# Patient Record
Sex: FEMALE | Race: OTHER | HISPANIC OR LATINO
[De-identification: names, ages, dates, MRNs, and addresses within clinical notes are randomized per-mention and may not be internally consistent; named-entity substitution may affect disease eponyms.]

---

## 2023-11-13 PROBLEM — Z00.00 ENCOUNTER FOR PREVENTIVE HEALTH EXAMINATION: Status: ACTIVE | Noted: 2023-11-13

## 2023-11-21 ENCOUNTER — APPOINTMENT (OUTPATIENT)
Dept: CARDIOLOGY | Facility: CLINIC | Age: 58
End: 2023-11-21
Payer: MEDICAID

## 2023-11-21 ENCOUNTER — NON-APPOINTMENT (OUTPATIENT)
Age: 58
End: 2023-11-21

## 2023-11-21 VITALS
SYSTOLIC BLOOD PRESSURE: 136 MMHG | HEART RATE: 67 BPM | WEIGHT: 159 LBS | HEIGHT: 59.06 IN | RESPIRATION RATE: 16 BRPM | DIASTOLIC BLOOD PRESSURE: 72 MMHG | TEMPERATURE: 98.4 F | OXYGEN SATURATION: 99 % | BODY MASS INDEX: 32.05 KG/M2

## 2023-11-21 DIAGNOSIS — I51.7 CARDIOMEGALY: ICD-10-CM

## 2023-11-21 DIAGNOSIS — Z78.9 OTHER SPECIFIED HEALTH STATUS: ICD-10-CM

## 2023-11-21 PROCEDURE — 93000 ELECTROCARDIOGRAM COMPLETE: CPT

## 2023-11-21 PROCEDURE — 99204 OFFICE O/P NEW MOD 45 MIN: CPT | Mod: 25

## 2023-11-28 ENCOUNTER — NON-APPOINTMENT (OUTPATIENT)
Age: 58
End: 2023-11-28

## 2023-11-28 PROBLEM — Z00.00 ENCOUNTER FOR PREVENTIVE HEALTH EXAMINATION: Status: ACTIVE | Noted: 2023-11-28

## 2023-12-01 ENCOUNTER — OUTPATIENT (OUTPATIENT)
Dept: OUTPATIENT SERVICES | Facility: HOSPITAL | Age: 58
LOS: 1 days | End: 2023-12-01
Payer: MEDICAID

## 2023-12-01 ENCOUNTER — APPOINTMENT (OUTPATIENT)
Dept: CT IMAGING | Facility: CLINIC | Age: 58
End: 2023-12-01
Payer: MEDICAID

## 2023-12-01 ENCOUNTER — APPOINTMENT (OUTPATIENT)
Dept: CT IMAGING | Facility: CLINIC | Age: 58
End: 2023-12-01

## 2023-12-01 DIAGNOSIS — Z00.8 ENCOUNTER FOR OTHER GENERAL EXAMINATION: ICD-10-CM

## 2023-12-01 DIAGNOSIS — R07.89 OTHER CHEST PAIN: ICD-10-CM

## 2023-12-01 PROCEDURE — 75574 CT ANGIO HRT W/3D IMAGE: CPT

## 2023-12-01 PROCEDURE — 75574 CT ANGIO HRT W/3D IMAGE: CPT | Mod: 26

## 2023-12-22 ENCOUNTER — TRANSCRIPTION ENCOUNTER (OUTPATIENT)
Age: 58
End: 2023-12-22

## 2023-12-22 ENCOUNTER — APPOINTMENT (OUTPATIENT)
Dept: CARDIOLOGY | Facility: CLINIC | Age: 58
End: 2023-12-22
Payer: MEDICAID

## 2023-12-22 PROCEDURE — 93306 TTE W/DOPPLER COMPLETE: CPT

## 2023-12-25 ENCOUNTER — INPATIENT (INPATIENT)
Facility: HOSPITAL | Age: 58
LOS: 3 days | Discharge: HOME HEALTH | DRG: 347 | End: 2023-12-29
Attending: INTERNAL MEDICINE | Admitting: INTERNAL MEDICINE
Payer: MEDICAID

## 2023-12-25 VITALS — HEIGHT: 62 IN | WEIGHT: 162.04 LBS

## 2023-12-25 DIAGNOSIS — I63.9 CEREBRAL INFARCTION, UNSPECIFIED: ICD-10-CM

## 2023-12-25 DIAGNOSIS — N28.1 CYST OF KIDNEY, ACQUIRED: Chronic | ICD-10-CM

## 2023-12-25 LAB
ABO RH CONFIRMATION: SIGNIFICANT CHANGE UP
ABO RH CONFIRMATION: SIGNIFICANT CHANGE UP
ALBUMIN SERPL ELPH-MCNC: 3.9 G/DL — SIGNIFICANT CHANGE UP (ref 3.3–5)
ALBUMIN SERPL ELPH-MCNC: 3.9 G/DL — SIGNIFICANT CHANGE UP (ref 3.3–5)
ALP SERPL-CCNC: 61 U/L — SIGNIFICANT CHANGE UP (ref 40–120)
ALP SERPL-CCNC: 61 U/L — SIGNIFICANT CHANGE UP (ref 40–120)
ALT FLD-CCNC: 21 U/L — SIGNIFICANT CHANGE UP (ref 12–78)
ALT FLD-CCNC: 21 U/L — SIGNIFICANT CHANGE UP (ref 12–78)
ANION GAP SERPL CALC-SCNC: 2 MMOL/L — LOW (ref 5–17)
ANION GAP SERPL CALC-SCNC: 2 MMOL/L — LOW (ref 5–17)
APPEARANCE UR: CLEAR — SIGNIFICANT CHANGE UP
APPEARANCE UR: CLEAR — SIGNIFICANT CHANGE UP
APTT BLD: 35 SEC — SIGNIFICANT CHANGE UP (ref 24.5–35.6)
APTT BLD: 35 SEC — SIGNIFICANT CHANGE UP (ref 24.5–35.6)
AST SERPL-CCNC: 24 U/L — SIGNIFICANT CHANGE UP (ref 15–37)
AST SERPL-CCNC: 24 U/L — SIGNIFICANT CHANGE UP (ref 15–37)
BASOPHILS # BLD AUTO: 0.07 K/UL — SIGNIFICANT CHANGE UP (ref 0–0.2)
BASOPHILS # BLD AUTO: 0.07 K/UL — SIGNIFICANT CHANGE UP (ref 0–0.2)
BASOPHILS NFR BLD AUTO: 1.1 % — SIGNIFICANT CHANGE UP (ref 0–2)
BASOPHILS NFR BLD AUTO: 1.1 % — SIGNIFICANT CHANGE UP (ref 0–2)
BILIRUB SERPL-MCNC: 0.5 MG/DL — SIGNIFICANT CHANGE UP (ref 0.2–1.2)
BILIRUB SERPL-MCNC: 0.5 MG/DL — SIGNIFICANT CHANGE UP (ref 0.2–1.2)
BILIRUB UR-MCNC: NEGATIVE — SIGNIFICANT CHANGE UP
BILIRUB UR-MCNC: NEGATIVE — SIGNIFICANT CHANGE UP
BLD GP AB SCN SERPL QL: SIGNIFICANT CHANGE UP
BLD GP AB SCN SERPL QL: SIGNIFICANT CHANGE UP
BUN SERPL-MCNC: 14 MG/DL — SIGNIFICANT CHANGE UP (ref 7–23)
BUN SERPL-MCNC: 14 MG/DL — SIGNIFICANT CHANGE UP (ref 7–23)
CALCIUM SERPL-MCNC: 8.7 MG/DL — SIGNIFICANT CHANGE UP (ref 8.5–10.1)
CALCIUM SERPL-MCNC: 8.7 MG/DL — SIGNIFICANT CHANGE UP (ref 8.5–10.1)
CHLORIDE SERPL-SCNC: 110 MMOL/L — HIGH (ref 96–108)
CHLORIDE SERPL-SCNC: 110 MMOL/L — HIGH (ref 96–108)
CO2 SERPL-SCNC: 28 MMOL/L — SIGNIFICANT CHANGE UP (ref 22–31)
CO2 SERPL-SCNC: 28 MMOL/L — SIGNIFICANT CHANGE UP (ref 22–31)
COLOR SPEC: YELLOW — SIGNIFICANT CHANGE UP
COLOR SPEC: YELLOW — SIGNIFICANT CHANGE UP
CREAT SERPL-MCNC: 0.82 MG/DL — SIGNIFICANT CHANGE UP (ref 0.5–1.3)
CREAT SERPL-MCNC: 0.82 MG/DL — SIGNIFICANT CHANGE UP (ref 0.5–1.3)
DIFF PNL FLD: NEGATIVE — SIGNIFICANT CHANGE UP
DIFF PNL FLD: NEGATIVE — SIGNIFICANT CHANGE UP
EGFR: 82 ML/MIN/1.73M2 — SIGNIFICANT CHANGE UP
EGFR: 82 ML/MIN/1.73M2 — SIGNIFICANT CHANGE UP
EOSINOPHIL # BLD AUTO: 0.08 K/UL — SIGNIFICANT CHANGE UP (ref 0–0.5)
EOSINOPHIL # BLD AUTO: 0.08 K/UL — SIGNIFICANT CHANGE UP (ref 0–0.5)
EOSINOPHIL NFR BLD AUTO: 1.3 % — SIGNIFICANT CHANGE UP (ref 0–6)
EOSINOPHIL NFR BLD AUTO: 1.3 % — SIGNIFICANT CHANGE UP (ref 0–6)
GLUCOSE SERPL-MCNC: 113 MG/DL — HIGH (ref 70–99)
GLUCOSE SERPL-MCNC: 113 MG/DL — HIGH (ref 70–99)
GLUCOSE UR QL: NEGATIVE MG/DL — SIGNIFICANT CHANGE UP
GLUCOSE UR QL: NEGATIVE MG/DL — SIGNIFICANT CHANGE UP
HCT VFR BLD CALC: 38.9 % — SIGNIFICANT CHANGE UP (ref 34.5–45)
HCT VFR BLD CALC: 38.9 % — SIGNIFICANT CHANGE UP (ref 34.5–45)
HGB BLD-MCNC: 12.9 G/DL — SIGNIFICANT CHANGE UP (ref 11.5–15.5)
HGB BLD-MCNC: 12.9 G/DL — SIGNIFICANT CHANGE UP (ref 11.5–15.5)
IMM GRANULOCYTES NFR BLD AUTO: 0.2 % — SIGNIFICANT CHANGE UP (ref 0–0.9)
IMM GRANULOCYTES NFR BLD AUTO: 0.2 % — SIGNIFICANT CHANGE UP (ref 0–0.9)
INR BLD: 1 RATIO — SIGNIFICANT CHANGE UP (ref 0.85–1.18)
INR BLD: 1 RATIO — SIGNIFICANT CHANGE UP (ref 0.85–1.18)
KETONES UR-MCNC: NEGATIVE MG/DL — SIGNIFICANT CHANGE UP
KETONES UR-MCNC: NEGATIVE MG/DL — SIGNIFICANT CHANGE UP
LEUKOCYTE ESTERASE UR-ACNC: NEGATIVE — SIGNIFICANT CHANGE UP
LEUKOCYTE ESTERASE UR-ACNC: NEGATIVE — SIGNIFICANT CHANGE UP
LYMPHOCYTES # BLD AUTO: 2.21 K/UL — SIGNIFICANT CHANGE UP (ref 1–3.3)
LYMPHOCYTES # BLD AUTO: 2.21 K/UL — SIGNIFICANT CHANGE UP (ref 1–3.3)
LYMPHOCYTES # BLD AUTO: 34.6 % — SIGNIFICANT CHANGE UP (ref 13–44)
LYMPHOCYTES # BLD AUTO: 34.6 % — SIGNIFICANT CHANGE UP (ref 13–44)
MCHC RBC-ENTMCNC: 29.3 PG — SIGNIFICANT CHANGE UP (ref 27–34)
MCHC RBC-ENTMCNC: 29.3 PG — SIGNIFICANT CHANGE UP (ref 27–34)
MCHC RBC-ENTMCNC: 33.2 GM/DL — SIGNIFICANT CHANGE UP (ref 32–36)
MCHC RBC-ENTMCNC: 33.2 GM/DL — SIGNIFICANT CHANGE UP (ref 32–36)
MCV RBC AUTO: 88.4 FL — SIGNIFICANT CHANGE UP (ref 80–100)
MCV RBC AUTO: 88.4 FL — SIGNIFICANT CHANGE UP (ref 80–100)
MONOCYTES # BLD AUTO: 0.6 K/UL — SIGNIFICANT CHANGE UP (ref 0–0.9)
MONOCYTES # BLD AUTO: 0.6 K/UL — SIGNIFICANT CHANGE UP (ref 0–0.9)
MONOCYTES NFR BLD AUTO: 9.4 % — SIGNIFICANT CHANGE UP (ref 2–14)
MONOCYTES NFR BLD AUTO: 9.4 % — SIGNIFICANT CHANGE UP (ref 2–14)
NEUTROPHILS # BLD AUTO: 3.42 K/UL — SIGNIFICANT CHANGE UP (ref 1.8–7.4)
NEUTROPHILS # BLD AUTO: 3.42 K/UL — SIGNIFICANT CHANGE UP (ref 1.8–7.4)
NEUTROPHILS NFR BLD AUTO: 53.4 % — SIGNIFICANT CHANGE UP (ref 43–77)
NEUTROPHILS NFR BLD AUTO: 53.4 % — SIGNIFICANT CHANGE UP (ref 43–77)
NITRITE UR-MCNC: NEGATIVE — SIGNIFICANT CHANGE UP
NITRITE UR-MCNC: NEGATIVE — SIGNIFICANT CHANGE UP
PH UR: 8 — SIGNIFICANT CHANGE UP (ref 5–8)
PH UR: 8 — SIGNIFICANT CHANGE UP (ref 5–8)
PLATELET # BLD AUTO: 221 K/UL — SIGNIFICANT CHANGE UP (ref 150–400)
PLATELET # BLD AUTO: 221 K/UL — SIGNIFICANT CHANGE UP (ref 150–400)
POTASSIUM SERPL-MCNC: 4.1 MMOL/L — SIGNIFICANT CHANGE UP (ref 3.5–5.3)
POTASSIUM SERPL-MCNC: 4.1 MMOL/L — SIGNIFICANT CHANGE UP (ref 3.5–5.3)
POTASSIUM SERPL-SCNC: 4.1 MMOL/L — SIGNIFICANT CHANGE UP (ref 3.5–5.3)
POTASSIUM SERPL-SCNC: 4.1 MMOL/L — SIGNIFICANT CHANGE UP (ref 3.5–5.3)
PROT SERPL-MCNC: 7.6 GM/DL — SIGNIFICANT CHANGE UP (ref 6–8.3)
PROT SERPL-MCNC: 7.6 GM/DL — SIGNIFICANT CHANGE UP (ref 6–8.3)
PROT UR-MCNC: NEGATIVE MG/DL — SIGNIFICANT CHANGE UP
PROT UR-MCNC: NEGATIVE MG/DL — SIGNIFICANT CHANGE UP
PROTHROM AB SERPL-ACNC: 11.3 SEC — SIGNIFICANT CHANGE UP (ref 9.5–13)
PROTHROM AB SERPL-ACNC: 11.3 SEC — SIGNIFICANT CHANGE UP (ref 9.5–13)
RBC # BLD: 4.4 M/UL — SIGNIFICANT CHANGE UP (ref 3.8–5.2)
RBC # BLD: 4.4 M/UL — SIGNIFICANT CHANGE UP (ref 3.8–5.2)
RBC # FLD: 12.8 % — SIGNIFICANT CHANGE UP (ref 10.3–14.5)
RBC # FLD: 12.8 % — SIGNIFICANT CHANGE UP (ref 10.3–14.5)
SODIUM SERPL-SCNC: 140 MMOL/L — SIGNIFICANT CHANGE UP (ref 135–145)
SODIUM SERPL-SCNC: 140 MMOL/L — SIGNIFICANT CHANGE UP (ref 135–145)
SP GR SPEC: 1.02 — SIGNIFICANT CHANGE UP (ref 1–1.03)
SP GR SPEC: 1.02 — SIGNIFICANT CHANGE UP (ref 1–1.03)
TROPONIN I, HIGH SENSITIVITY RESULT: 5.61 NG/L — SIGNIFICANT CHANGE UP
TROPONIN I, HIGH SENSITIVITY RESULT: 5.61 NG/L — SIGNIFICANT CHANGE UP
TROPONIN I, HIGH SENSITIVITY RESULT: 5.64 NG/L — SIGNIFICANT CHANGE UP
TROPONIN I, HIGH SENSITIVITY RESULT: 5.64 NG/L — SIGNIFICANT CHANGE UP
TROPONIN I, HIGH SENSITIVITY RESULT: 6.49 NG/L — SIGNIFICANT CHANGE UP
TROPONIN I, HIGH SENSITIVITY RESULT: 6.49 NG/L — SIGNIFICANT CHANGE UP
UROBILINOGEN FLD QL: 0.2 MG/DL — SIGNIFICANT CHANGE UP (ref 0.2–1)
UROBILINOGEN FLD QL: 0.2 MG/DL — SIGNIFICANT CHANGE UP (ref 0.2–1)
WBC # BLD: 6.39 K/UL — SIGNIFICANT CHANGE UP (ref 3.8–10.5)
WBC # BLD: 6.39 K/UL — SIGNIFICANT CHANGE UP (ref 3.8–10.5)
WBC # FLD AUTO: 6.39 K/UL — SIGNIFICANT CHANGE UP (ref 3.8–10.5)
WBC # FLD AUTO: 6.39 K/UL — SIGNIFICANT CHANGE UP (ref 3.8–10.5)

## 2023-12-25 PROCEDURE — 97166 OT EVAL MOD COMPLEX 45 MIN: CPT | Mod: GO

## 2023-12-25 PROCEDURE — 70496 CT ANGIOGRAPHY HEAD: CPT | Mod: 26,MA

## 2023-12-25 PROCEDURE — 99291 CRITICAL CARE FIRST HOUR: CPT

## 2023-12-25 PROCEDURE — 84484 ASSAY OF TROPONIN QUANT: CPT

## 2023-12-25 PROCEDURE — 70450 CT HEAD/BRAIN W/O DYE: CPT | Mod: 26,MA,59

## 2023-12-25 PROCEDURE — 0042T: CPT | Mod: MA

## 2023-12-25 PROCEDURE — 36415 COLL VENOUS BLD VENIPUNCTURE: CPT

## 2023-12-25 PROCEDURE — 72148 MRI LUMBAR SPINE W/O DYE: CPT

## 2023-12-25 PROCEDURE — 83036 HEMOGLOBIN GLYCOSYLATED A1C: CPT

## 2023-12-25 PROCEDURE — 84443 ASSAY THYROID STIM HORMONE: CPT

## 2023-12-25 PROCEDURE — 85027 COMPLETE CBC AUTOMATED: CPT

## 2023-12-25 PROCEDURE — 84100 ASSAY OF PHOSPHORUS: CPT

## 2023-12-25 PROCEDURE — 72141 MRI NECK SPINE W/O DYE: CPT

## 2023-12-25 PROCEDURE — 97163 PT EVAL HIGH COMPLEX 45 MIN: CPT | Mod: GP

## 2023-12-25 PROCEDURE — 92523 SPEECH SOUND LANG COMPREHEN: CPT | Mod: GN

## 2023-12-25 PROCEDURE — 72146 MRI CHEST SPINE W/O DYE: CPT

## 2023-12-25 PROCEDURE — 99223 1ST HOSP IP/OBS HIGH 75: CPT

## 2023-12-25 PROCEDURE — 80061 LIPID PANEL: CPT

## 2023-12-25 PROCEDURE — 97116 GAIT TRAINING THERAPY: CPT | Mod: GP

## 2023-12-25 PROCEDURE — 80048 BASIC METABOLIC PNL TOTAL CA: CPT

## 2023-12-25 PROCEDURE — 70498 CT ANGIOGRAPHY NECK: CPT | Mod: 26,MA

## 2023-12-25 PROCEDURE — 86803 HEPATITIS C AB TEST: CPT

## 2023-12-25 PROCEDURE — 97530 THERAPEUTIC ACTIVITIES: CPT | Mod: GO

## 2023-12-25 PROCEDURE — 93306 TTE W/DOPPLER COMPLETE: CPT

## 2023-12-25 PROCEDURE — 93010 ELECTROCARDIOGRAM REPORT: CPT

## 2023-12-25 PROCEDURE — 70551 MRI BRAIN STEM W/O DYE: CPT

## 2023-12-25 PROCEDURE — 97110 THERAPEUTIC EXERCISES: CPT | Mod: GO

## 2023-12-25 PROCEDURE — 71045 X-RAY EXAM CHEST 1 VIEW: CPT | Mod: 26

## 2023-12-25 PROCEDURE — 92610 EVALUATE SWALLOWING FUNCTION: CPT | Mod: GN

## 2023-12-25 PROCEDURE — 83735 ASSAY OF MAGNESIUM: CPT

## 2023-12-25 PROCEDURE — 99284 EMERGENCY DEPT VISIT MOD MDM: CPT

## 2023-12-25 RX ORDER — ASPIRIN/CALCIUM CARB/MAGNESIUM 324 MG
324 TABLET ORAL ONCE
Refills: 0 | Status: COMPLETED | OUTPATIENT
Start: 2023-12-25 | End: 2023-12-25

## 2023-12-25 RX ORDER — ONDANSETRON 8 MG/1
4 TABLET, FILM COATED ORAL EVERY 8 HOURS
Refills: 0 | Status: DISCONTINUED | OUTPATIENT
Start: 2023-12-25 | End: 2023-12-29

## 2023-12-25 RX ORDER — CLOPIDOGREL BISULFATE 75 MG/1
75 TABLET, FILM COATED ORAL DAILY
Refills: 0 | Status: DISCONTINUED | OUTPATIENT
Start: 2023-12-26 | End: 2023-12-26

## 2023-12-25 RX ORDER — ACETAMINOPHEN 500 MG
650 TABLET ORAL EVERY 6 HOURS
Refills: 0 | Status: DISCONTINUED | OUTPATIENT
Start: 2023-12-25 | End: 2023-12-29

## 2023-12-25 RX ORDER — ATORVASTATIN CALCIUM 80 MG/1
80 TABLET, FILM COATED ORAL AT BEDTIME
Refills: 0 | Status: DISCONTINUED | OUTPATIENT
Start: 2023-12-25 | End: 2023-12-26

## 2023-12-25 RX ORDER — PANTOPRAZOLE SODIUM 20 MG/1
40 TABLET, DELAYED RELEASE ORAL
Refills: 0 | Status: DISCONTINUED | OUTPATIENT
Start: 2023-12-25 | End: 2023-12-29

## 2023-12-25 RX ORDER — CLOPIDOGREL BISULFATE 75 MG/1
300 TABLET, FILM COATED ORAL ONCE
Refills: 0 | Status: COMPLETED | OUTPATIENT
Start: 2023-12-25 | End: 2023-12-25

## 2023-12-25 RX ORDER — ASPIRIN/CALCIUM CARB/MAGNESIUM 324 MG
81 TABLET ORAL DAILY
Refills: 0 | Status: DISCONTINUED | OUTPATIENT
Start: 2023-12-26 | End: 2023-12-26

## 2023-12-25 RX ORDER — LOSARTAN POTASSIUM 100 MG/1
50 TABLET, FILM COATED ORAL DAILY
Refills: 0 | Status: DISCONTINUED | OUTPATIENT
Start: 2023-12-25 | End: 2023-12-29

## 2023-12-25 RX ADMIN — CLOPIDOGREL BISULFATE 300 MILLIGRAM(S): 75 TABLET, FILM COATED ORAL at 15:20

## 2023-12-25 RX ADMIN — Medication 324 MILLIGRAM(S): at 11:34

## 2023-12-25 RX ADMIN — ATORVASTATIN CALCIUM 80 MILLIGRAM(S): 80 TABLET, FILM COATED ORAL at 21:27

## 2023-12-25 RX ADMIN — LOSARTAN POTASSIUM 50 MILLIGRAM(S): 100 TABLET, FILM COATED ORAL at 15:20

## 2023-12-25 NOTE — H&P ADULT - NSHPSOCIALHISTORY_GEN_ALL_CORE
No smoking, alcohol, or drug use.  Lives with her .  Recently moved to the [place] a few months ago, does not work.

## 2023-12-25 NOTE — PATIENT PROFILE ADULT - FALL HARM RISK - HARM RISK INTERVENTIONS
Assistance with ambulation/Assistance OOB with selected safe patient handling equipment/Communicate Risk of Fall with Harm to all staff/Reinforce activity limits and safety measures with patient and family/Tailored Fall Risk Interventions/Visual Cue: Yellow wristband and red socks/Bed in lowest position, wheels locked, appropriate side rails in place/Call bell, personal items and telephone in reach/Instruct patient to call for assistance before getting out of bed or chair/Non-slip footwear when patient is out of bed/[place] to call system/Physically safe environment - no spills, clutter or unnecessary equipment/Purposeful Proactive Rounding/Room/bathroom lighting operational, light cord in reach Assistance with ambulation/Assistance OOB with selected safe patient handling equipment/Communicate Risk of Fall with Harm to all staff/Reinforce activity limits and safety measures with patient and family/Tailored Fall Risk Interventions/Visual Cue: Yellow wristband and red socks/Bed in lowest position, wheels locked, appropriate side rails in place/Call bell, personal items and telephone in reach/Instruct patient to call for assistance before getting out of bed or chair/Non-slip footwear when patient is out of bed/[place] to call system/Physically safe environment - no spills, clutter or unnecessary equipment/Purposeful Proactive Rounding/Room/bathroom lighting operational, light cord in reach

## 2023-12-25 NOTE — ED ADULT TRIAGE NOTE - CHIEF COMPLAINT QUOTE
Patient presents to ED ambulatory c/o headache, chest pain, SOB, left arm pain, nausea, dizziness, cough that started around 4am today. Pt states hx of enlarged heart and HTN and has not been able to take her medications for past 3 days due to not able to get a refill. Denies numbness/tingling, vomiting. EKG in process.

## 2023-12-25 NOTE — PHARMACOTHERAPY INTERVENTION NOTE - COMMENTS
Medication history complete, reviewed medications with patient and family at bedside and confirmed with doctor first med hx.

## 2023-12-25 NOTE — SWALLOW BEDSIDE ASSESSMENT ADULT - COMMENTS
The pt was admitted to  with chest pain, HA, and LUE/LLE weakness-tingling sensation. CTH negative. This profile is superimposed upon a history of HTN, HLD and prior surgery for a renal cyst.

## 2023-12-25 NOTE — SWALLOW BEDSIDE ASSESSMENT ADULT - SLP GENERAL OBSERVATIONS
The patient is alert and interactive. She is Kinyarw[place] speaking. Her receptive language abilities were preserved and she was able to verbalize during communicative probes without evidence of a primary motor speech or primary linguistic pathology. Pt is communicatively competent and at reported speech-language baseline. The patient is alert and interactive. She is Tajik speaking. Her receptive language abilities were preserved and she was able to verbalize during communicative probes without evidence of a primary motor speech or primary linguistic pathology. Pt is communicatively competent and at reported speech-language baseline.

## 2023-12-25 NOTE — H&P ADULT - ASSESSMENT
57 yo Faroese-speaking F with a PMH of HTN and HLD who presents with acute LUE/LLE weakness, chest pain, and headache in the setting of uncontrolled HTN, with persistent neurological deficits, r/o CVA.    #(R/o) Acute CVA  - Patient with new, acute L sided motor and sensory deficits that are still present, suspicious for R sided CVA  - Ddx includes complex migraine  - Outside of window for tPA  - EKG non-pathological, no STEMI  - Aspirin and Plavix load and continue  - Increasing patient's statin from 20 mg to 80 mg (high-dose)  - MRI brain without contrast  - BP control  - Neuro checks Q4H  - Tylenol PRN for headache  - Fall precautions, PT/OT eval  - Monitor on telemetry  - Check TTE  - Trend troponins at least x3  - Appreciate neurology recs    #Uncontrolled Hypertension  - Patient had ran out of meds x 2 days, BP was normal upon ED arrival but elevated multiple times after and on my exam  - Restart losartan 50 mg QD for now  - If not controlled, consider calcium channel blocker addition  - Cardiac workup including TTE    #Atypical Chest Pain  - Patient's CP is radiating to the back  - Troponins normal x2, repeat at least one more  - Suspect in the setting of HTN, less likely ischemic but suspect patient may have cardiomyopathy  - BPs equal on both sides and sxs improved after aspirin, not c/w dissection  - Check TTE  - Vital signs Q4H  - BP control    #Prophylactic Measure  - DVT PPX: SCDs  - Diet: DASH  - Dispo: pending evaluation above 59 yo Armenian-speaking F with a PMH of HTN and HLD who presents with acute LUE/LLE weakness, chest pain, and headache in the setting of uncontrolled HTN, with persistent neurological deficits, r/o CVA.    #(R/o) Acute CVA  - Patient with new, acute L sided motor and sensory deficits that are still present, suspicious for R sided CVA  - Ddx includes complex migraine  - Outside of window for tPA  - EKG non-pathological, no STEMI  - Aspirin and Plavix load and continue  - Increasing patient's statin from 20 mg to 80 mg (high-dose)  - MRI brain without contrast  - BP control  - Neuro checks Q4H  - Tylenol PRN for headache  - Fall precautions, PT/OT eval  - Monitor on telemetry  - Check TTE  - Trend troponins at least x3  - Appreciate neurology recs    #Uncontrolled Hypertension  - Patient had ran out of meds x 2 days, BP was normal upon ED arrival but elevated multiple times after and on my exam  - Restart losartan 50 mg QD for now  - If not controlled, consider calcium channel blocker addition  - Cardiac workup including TTE    #Atypical Chest Pain  - Patient's CP is radiating to the back  - Troponins normal x2, repeat at least one more  - Suspect in the setting of HTN, less likely ischemic but suspect patient may have cardiomyopathy  - BPs equal on both sides and sxs improved after aspirin, not c/w dissection  - Check TTE  - Vital signs Q4H  - BP control    #Prophylactic Measure  - DVT PPX: SCDs  - Diet: DASH  - Dispo: pending evaluation above

## 2023-12-25 NOTE — ED PROVIDER NOTE - OBJECTIVE STATEMENT
59 yo female w/PMHx HTN and HLD presents to the ED c/o LUE and LLE weakness onset 2am when awaking to go to the bathroom. Pt also c/o chest pain and dizziness. States she ran out of her Losartan 50mg a few days ago and has missed a couple of doses. Pt went to bed last night at 10pm and felt normal. Symptoms persisted to this morning, so the pt decided to come to the ED for further evaluation. Pt is a non smoker, non drinker, no illegal drug use. Pt has a family history of MI, brother had an MI at 28 and dad had an MI at 63. [id number] 59 yo female w/PMHx HTN and HLD presents to the ED c/o LUE and LLE weakness onset 2am when awaking to go to the bathroom. Pt also c/o chest pain and dizziness. States she ran out of her Losartan 50mg a few days ago and has missed a couple of doses. Pt went to bed last night at 10pm and felt normal. Symptoms persisted to this morning, so the pt decided to come to the ED for further evaluation. Pt is a non smoker, non drinker, no illegal drug use. Pt has a family history of MI, brother had an MI at 28 and dad had an MI at 63. [id number] 57 yo female w/PMHx HTN and HLD presents to the ED c/o LUE and LLE weakness onset 2am when awaking to go to the bathroom. Pt also c/o chest pain and dizziness. States she ran out of her Losartan 50mg a few days ago and has missed a couple of doses. Pt went to bed last night at 10pm and felt normal. Symptoms persisted to this morning, so the pt decided to come to the ED for further evaluation. Pt is a non smoker, non drinker, no illegal drug use. Pt has a family history of MI, brother had an MI at 28 and dad had an MI at 63.  used, id# [id number] 59 yo female w/PMHx HTN and HLD presents to the ED c/o LUE and LLE weakness onset 2am when awaking to go to the bathroom. Pt also c/o chest pain and dizziness. States she ran out of her Losartan 50mg a few days ago and has missed a couple of doses. Pt went to bed last night at 10pm and felt normal. Symptoms persisted to this morning, so the pt decided to come to the ED for further evaluation. Pt is a non smoker, non drinker, no illegal drug use. Pt has a family history of MI, brother had an MI at 28 and dad had an MI at 63.  used, id# [id number]

## 2023-12-25 NOTE — H&P ADULT - NSHPLABSRESULTS_GEN_ALL_CORE
Labs personally reviewed and interpreted. Notable for no leukocytosis (WBC 6.39), left shift, or lymphopenia. Hb 12.9, plt 221, INR 1.0, Electrolytes wnl, HCO3 28, BUN/creatinine 14/0.82, , LFTs wnl, and HS Troponin  I normal x1 at 5.61 and again at 5.64.  UA SG 1.02 and otherwise unremarkable.    CXR personally reviewed and interpreted. Notable for clear lungs, no focal consolidations, effusions, interstitial markings, pneumothorax, or obvious cardiomegaly.  CT head and CTA head/neck personally reviewed and interpreted. Notable for unremarkable CTA of the head and neck. No large vessel occlusion. Normal CT perfusion. Unremarkable noncontrast CT of the brain.    EKG personally reviewed. Normal sinus rhythm, normal axis. TWIs isolated to leads III and V3, not pathological and unchanged from prior (outpatient). 1/4 mm concave ST elevations in leads I and aVL that are not pathological, not STEMI. No pathological Q waves or other ST changes. Rate 80, , QTc 438.

## 2023-12-25 NOTE — CONSULT NOTE ADULT - SUBJECTIVE AND OBJECTIVE BOX
Neurology Consult requested by:   Patient is a 58y old  Female who presents with a chief complaint of    HPI:      PAST MEDICAL & SURGICAL HISTORY:    FAMILY HISTORY:    Social Hx:  Nonsmoker, no drug or alcohol use  Medications and Allergies ReviewedMEDICATIONS  (STANDING):     ROS: Pertinent positives in HPI, all other ROS were reviewed and are negative.      Examination:   Vital Signs Last 24 Hrs  T(C): 36.8 (25 Dec 2023 09:35), Max: 36.8 (25 Dec 2023 08:35)  T(F): 98.2 (25 Dec 2023 09:35), Max: 98.2 (25 Dec 2023 08:35)  HR: 75 (25 Dec 2023 11:35) (69 - 96)  BP: 154/94 (25 Dec 2023 11:35) (123/76 - 154/94)  BP(mean): 114 (25 Dec 2023 11:35) (90 - 114)  RR: 15 (25 Dec 2023 11:35) (15 - 18)  SpO2: 100% (25 Dec 2023 11:35) (97% - 100%)    Parameters below as of 25 Dec 2023 11:35  Patient On (Oxygen Delivery Method): room air      General: Cooperative, NAD   NECK: supple, no masses  ENT: Normal hearing   Vascular : no carotid bruits,   Lungs: CTAB  Chest: RRR, no murmurs  Extremities: nontender, no edema  Musculoskeletal: no adventitious movements, no joint stiffness  Skin: no rash    Neurological Examination:  NIHSS:  MS: AOx3. Appropriately interactive, normal affect. Speech fluent w/o paraphasic error, repetition, naming, reading is intact   CN: No Papilledema, PERLL, EOMI, V1-3 sensation intact, face symmetric, hearing intact, palate elevates symmetrically, tongue midline, SCM equal bilaterally  Motor: normal bulk and tone, no tremor, rigidity or bradykinesia.  5/5 all over   Sens: Intact to light touch.    Reflexes: 2/4 all over, downgoing toes b/l  Coord:  No dysmetria, LASHAWN intact   Gait: Cannot test    Labs: Reviewed  Comprehensive Metabolic Panel (12.25.23 @ 09:11)   Sodium: 140 mmol/L  Potassium: 4.1 mmol/L  Chloride: 110 mmol/L  Carbon Dioxide: 28 mmol/L  Anion Gap: 2 mmol/L  Blood Urea Nitrogen: 14 mg/dL  Creatinine: 0.82 mg/dL  Glucose: 113 mg/dL  Calcium: 8.7 mg/dL  Protein Total: 7.6 gm/dL  Albumin: 3.9 g/dL  Bilirubin Total: 0.5 mg/dL  Alkaline Phosphatase: 61 U/L  Aspartate Aminotransferase (AST/SGOT): 24 U/L  Alanine Aminotransferase (ALT/SGPT): 21 U/L  eGFR: 82:      Complete Blood Count + Automated Diff (12.25.23 @ 09:11)   WBC Count: 6.39 K/uL  RBC Count: 4.40 M/uL  Hemoglobin: 12.9 g/dL  Hematocrit: 38.9 %  Mean Cell Volume: 88.4 fl  Mean Cell Hemoglobin: 29.3 pg  Mean Cell Hemoglobin Conc: 33.2 gm/dL  Red Cell Distrib Width: 12.8 %  Platelet Count - Automated: 221 K/uL    Activated Partial Thromboplastin Time: 35.0:   Prothrombin Time, Plasma: 11.3 sec (12.25.23 @ 09:11)         < from: CT Brain Perfusion Maps Stroke (12.25.23 @ 09:31) >  PROCEDURE DATE:  12/25/2023          INTERPRETATION:  Clinical indication: Code stroke for left-sided   weakness, c/o headache, chest pain, SOB, left arm pain, nausea,   dizziness, cough that started around 4am today. Pt states hx of enlarged   heart and HTN and has not been able to take her medications for past 3   days due to not able to get a refill    CT PERFUSION:    After the intravenous administration of 50 cc of Omnipaque 350 serial   thin sections were obtained through thebrain for the purposes of   evaluating CT perfusion. Raw data was sent to the rapid ischemia view   software for postprocessing.    No core infarct or delayed mean transit time is identified.    CBF<30% volume: 0 ml  Tmax> 6.0s volume: 0 ml  Mismatchvolume: 0 ml  Mismatch ratio: none      CTA head and neck:      After the intravenous power injection of 90 cc of Omnipaque 350 using a   bolus juarez timing run serial thin sections were obtained through the   neck from the thoracic inlet through theintracranial circulation   centered at the [id number] on a multislice CT scanner reformatted   with coronal and sagittal 2 D-MIP projections, including 3 D   reconstructions using a separate 3D [employer] software workstation.A total   of  140 cc of Omnipaque were intravenously injected.  0 cc were discarded.    There is calcification along the aortic arch. The origins of the carotid   and vertebral arteries are normal. The left vertebral artery is dominant.   The carotid bifurcations demonstrate no significant stenosis.    The distal vertebral arteries are well identified as are the   posterior-inferior cerebellar arteries bilaterally. The region of the   vertebral basilar junction is normal. The basilar artery is normal. The   posterior cerebral and superior cerebellar arteries are normal. Left   posterior communicating artery is identified.    Evaluation of the carotid arteries demonstrate normal appearance to the   distal cervical, petrous cavernous and supraclinoid internal carotid  arteries. The anterior cerebral arteries anterior communicating artery   and middle cerebral arteries are normal.    The normal intracranial venous circulation is identified. The left   transverse sinus is dominant. The superior sagittal sinus, internal   cerebral veins, vein of Bao, straight sinus, transverse sinuses,   sigmoid sinuses and internal jugular veins are normal. Cortical veins are   normal.        IMPRESSION: Unremarkable CTA of the head and neck. No large vessel   occlusion. Normal CT perfusion.    < end of copied text >      No IV tpa given because OOTW  Total Critical Care Time spent:   Neurology Consult requested by:   Patient is a 58y old  Female who presents with a chief complaint of    HPI:      PAST MEDICAL & SURGICAL HISTORY:    FAMILY HISTORY:    Social Hx:  Nonsmoker, no drug or alcohol use  Medications and Allergies ReviewedMEDICATIONS  (STANDING):     ROS: Pertinent positives in HPI, all other ROS were reviewed and are negative.      Examination:   Vital Signs Last 24 Hrs  T(C): 36.8 (25 Dec 2023 09:35), Max: 36.8 (25 Dec 2023 08:35)  T(F): 98.2 (25 Dec 2023 09:35), Max: 98.2 (25 Dec 2023 08:35)  HR: 75 (25 Dec 2023 11:35) (69 - 96)  BP: 154/94 (25 Dec 2023 11:35) (123/76 - 154/94)  BP(mean): 114 (25 Dec 2023 11:35) (90 - 114)  RR: 15 (25 Dec 2023 11:35) (15 - 18)  SpO2: 100% (25 Dec 2023 11:35) (97% - 100%)    Parameters below as of 25 Dec 2023 11:35  Patient On (Oxygen Delivery Method): room air      General: Cooperative, NAD   NECK: supple, no masses  ENT: Normal hearing   Vascular : no carotid bruits,   Lungs: CTAB  Chest: RRR, no murmurs  Extremities: nontender, no edema  Musculoskeletal: no adventitious movements, no joint stiffness  Skin: no rash    Neurological Examination:  NIHSS:  MS: AOx3. Appropriately interactive, normal affect. Speech fluent w/o paraphasic error, repetition, naming, reading is intact   CN: No Papilledema, PERLL, EOMI, V1-3 sensation intact, face symmetric, hearing intact, palate elevates symmetrically, tongue midline, SCM equal bilaterally  Motor: normal bulk and tone, no tremor, rigidity or bradykinesia.  5/5 all over   Sens: Intact to light touch.    Reflexes: 2/4 all over, downgoing toes b/l  Coord:  No dysmetria, LASHAWN intact   Gait: Cannot test    Labs: Reviewed  Comprehensive Metabolic Panel (12.25.23 @ 09:11)   Sodium: 140 mmol/L  Potassium: 4.1 mmol/L  Chloride: 110 mmol/L  Carbon Dioxide: 28 mmol/L  Anion Gap: 2 mmol/L  Blood Urea Nitrogen: 14 mg/dL  Creatinine: 0.82 mg/dL  Glucose: 113 mg/dL  Calcium: 8.7 mg/dL  Protein Total: 7.6 gm/dL  Albumin: 3.9 g/dL  Bilirubin Total: 0.5 mg/dL  Alkaline Phosphatase: 61 U/L  Aspartate Aminotransferase (AST/SGOT): 24 U/L  Alanine Aminotransferase (ALT/SGPT): 21 U/L  eGFR: 82:      Complete Blood Count + Automated Diff (12.25.23 @ 09:11)   WBC Count: 6.39 K/uL  RBC Count: 4.40 M/uL  Hemoglobin: 12.9 g/dL  Hematocrit: 38.9 %  Mean Cell Volume: 88.4 fl  Mean Cell Hemoglobin: 29.3 pg  Mean Cell Hemoglobin Conc: 33.2 gm/dL  Red Cell Distrib Width: 12.8 %  Platelet Count - Automated: 221 K/uL    Activated Partial Thromboplastin Time: 35.0:   Prothrombin Time, Plasma: 11.3 sec (12.25.23 @ 09:11)         < from: CT Brain Perfusion Maps Stroke (12.25.23 @ 09:31) >  PROCEDURE DATE:  12/25/2023          INTERPRETATION:  Clinical indication: Code stroke for left-sided   weakness, c/o headache, chest pain, SOB, left arm pain, nausea,   dizziness, cough that started around 4am today. Pt states hx of enlarged   heart and HTN and has not been able to take her medications for past 3   days due to not able to get a refill    CT PERFUSION:    After the intravenous administration of 50 cc of Omnipaque 350 serial   thin sections were obtained through thebrain for the purposes of   evaluating CT perfusion. Raw data was sent to the rapid ischemia view   software for postprocessing.    No core infarct or delayed mean transit time is identified.    CBF<30% volume: 0 ml  Tmax> 6.0s volume: 0 ml  Mismatchvolume: 0 ml  Mismatch ratio: none      CTA head and neck:      After the intravenous power injection of 90 cc of Omnipaque 350 using a   bolus juarez timing run serial thin sections were obtained through the   neck from the thoracic inlet through theintracranial circulation   centered at the [id number] on a multislice CT scanner reformatted   with coronal and sagittal 2 D-MIP projections, including 3 D   reconstructions using a separate 3D [employer] software workstation.A total   of  140 cc of Omnipaque were intravenously injected.  0 cc were discarded.    There is calcification along the aortic arch. The origins of the carotid   and vertebral arteries are normal. The left vertebral artery is dominant.   The carotid bifurcations demonstrate no significant stenosis.    The distal vertebral arteries are well identified as are the   posterior-inferior cerebellar arteries bilaterally. The region of the   vertebral basilar junction is normal. The basilar artery is normal. The   posterior cerebral and superior cerebellar arteries are normal. Left   posterior communicating artery is identified.    Evaluation of the carotid arteries demonstrate normal appearance to the   distal cervical, petrous cavernous and supraclinoid internal carotid  arteries. The anterior cerebral arteries anterior communicating artery   and middle cerebral arteries are normal.    The normal intracranial venous circulation is identified. The left   transverse sinus is dominant. The superior sagittal sinus, internal   cerebral veins, vein of Bao, straight sinus, transverse sinuses,   sigmoid sinuses and internal jugular veins are normal. Cortical veins are   normal.        IMPRESSION: Unremarkable CTA of the head and neck. No large vessel   occlusion. Normal CT perfusion.    < end of copied text >      No IV tpa given because OOTW  Total Critical Care Time spent:   Neurology Consult requested by:   Patient is a 58y old  Female who presents with a chief complaint of    HPI:  58 year old woman c/o since 2 AM of headache, left sided weakness and numbness, CP. Similar episodes in the past. Recently ran out of her B/p meds. No recent illness, falls.    PAST MEDICAL & SURGICAL HISTORY:    FAMILY HISTORY:    Social Hx:  Nonsmoker, no drug or alcohol use  Medications and Allergies ReviewedMEDICATIONS  (STANDING):     ROS: Pertinent positives in HPI, all other ROS were reviewed and are negative.      Examination:   Vital Signs Last 24 Hrs  T(C): 36.8 (25 Dec 2023 09:35), Max: 36.8 (25 Dec 2023 08:35)  T(F): 98.2 (25 Dec 2023 09:35), Max: 98.2 (25 Dec 2023 08:35)  HR: 75 (25 Dec 2023 11:35) (69 - 96)  BP: 154/94 (25 Dec 2023 11:35) (123/76 - 154/94)  BP(mean): 114 (25 Dec 2023 11:35) (90 - 114)  RR: 15 (25 Dec 2023 11:35) (15 - 18)  SpO2: 100% (25 Dec 2023 11:35) (97% - 100%)    Parameters below as of 25 Dec 2023 11:35  Patient On (Oxygen Delivery Method): room air      General: Cooperative, NAD   NECK: supple, no masses  ENT: Normal hearing   Vascular : no carotid bruits,   Lungs: CTAB  Chest: RRR, no murmurs  Extremities: nontender, no edema  Musculoskeletal: no adventitious movements, no joint stiffness  Skin: no rash    Neurological Examination:  NIHSS:6  MS: AOx3. Appropriately interactive, normal affect. Speech fluent w/o paraphasic error, repetition, naming  intact   CN: ? Left lower field cut, PERLL, EOMI, V1-3 decreased on left, face symmetric, hearing intact, palate elevates symmetrically, tongue midline, SCM equal bilaterally  Motor: normal bulk and tone, no tremor, rigidity or bradykinesia.  LUE 3/5, LL# 4/5, on right 5/5   Sens: reduced to light touch on left, extinguishes on LLE.    Reflexes: 2/4 all over, downgoing toes b/l  Coord:  Limited on left due to weakness no dysmetria on right   Gait: Cannot test    Labs: Reviewed  Comprehensive Metabolic Panel (12.25.23 @ 09:11)   Sodium: 140 mmol/L  Potassium: 4.1 mmol/L  Chloride: 110 mmol/L  Carbon Dioxide: 28 mmol/L  Anion Gap: 2 mmol/L  Blood Urea Nitrogen: 14 mg/dL  Creatinine: 0.82 mg/dL  Glucose: 113 mg/dL  Calcium: 8.7 mg/dL  Protein Total: 7.6 gm/dL  Albumin: 3.9 g/dL  Bilirubin Total: 0.5 mg/dL  Alkaline Phosphatase: 61 U/L  Aspartate Aminotransferase (AST/SGOT): 24 U/L  Alanine Aminotransferase (ALT/SGPT): 21 U/L  eGFR: 82:      Complete Blood Count + Automated Diff (12.25.23 @ 09:11)   WBC Count: 6.39 K/uL  RBC Count: 4.40 M/uL  Hemoglobin: 12.9 g/dL  Hematocrit: 38.9 %  Mean Cell Volume: 88.4 fl  Mean Cell Hemoglobin: 29.3 pg  Mean Cell Hemoglobin Conc: 33.2 gm/dL  Red Cell Distrib Width: 12.8 %  Platelet Count - Automated: 221 K/uL    Activated Partial Thromboplastin Time: 35.0:   Prothrombin Time, Plasma: 11.3 sec (12.25.23 @ 09:11)         < from: CT Brain Perfusion Maps Stroke (12.25.23 @ 09:31) >  PROCEDURE DATE:  12/25/2023          INTERPRETATION:  Clinical indication: Code stroke for left-sided   weakness, c/o headache, chest pain, SOB, left arm pain, nausea,   dizziness, cough that started around 4am today. Pt states hx of enlarged   heart and HTN and has not been able to take her medications for past 3   days due to not able to get a refill    CT PERFUSION:    After the intravenous administration of 50 cc of Omnipaque 350 serial   thin sections were obtained through thebrain for the purposes of   evaluating CT perfusion. Raw data was sent to the rapid ischemia view   software for postprocessing.    No core infarct or delayed mean transit time is identified.    CBF<30% volume: 0 ml  Tmax> 6.0s volume: 0 ml  Mismatchvolume: 0 ml  Mismatch ratio: none      CTA head and neck:      After the intravenous power injection of 90 cc of Omnipaque 350 using a   bolus juarez timing run serial thin sections were obtained through the   neck from the thoracic inlet through theintracranial circulation   centered at the [id number] on a multislice CT scanner reformatted   with coronal and sagittal 2 D-MIP projections, including 3 D   reconstructions using a separate 3D [employer] software workstation.A total   of  140 cc of Omnipaque were intravenously injected.  0 cc were discarded.    There is calcification along the aortic arch. The origins of the carotid   and vertebral arteries are normal. The left vertebral artery is dominant.   The carotid bifurcations demonstrate no significant stenosis.    The distal vertebral arteries are well identified as are the   posterior-inferior cerebellar arteries bilaterally. The region of the   vertebral basilar junction is normal. The basilar artery is normal. The   posterior cerebral and superior cerebellar arteries are normal. Left   posterior communicating artery is identified.    Evaluation of the carotid arteries demonstrate normal appearance to the   distal cervical, petrous cavernous and supraclinoid internal carotid  arteries. The anterior cerebral arteries anterior communicating artery   and middle cerebral arteries are normal.    The normal intracranial venous circulation is identified. The left   transverse sinus is dominant. The superior sagittal sinus, internal   cerebral veins, vein of Bao, straight sinus, transverse sinuses,   sigmoid sinuses and internal jugular veins are normal. Cortical veins are   normal.        IMPRESSION: Unremarkable CTA of the head and neck. No large vessel   occlusion. Normal CT perfusion.    < end of copied text >      No IV tpa given because OOTW  Total Critical Care Time spent:   Neurology Consult requested by:   Patient is a 58y old  Female who presents with a chief complaint of    HPI:  58 year old woman c/o since 2 AM of headache, left sided weakness and numbness, CP. Similar episodes in the past. Recently ran out of her B/p meds. No recent illness, falls.    PAST MEDICAL & SURGICAL HISTORY:    FAMILY HISTORY:    Social Hx:  Nonsmoker, no drug or alcohol use  Medications and Allergies ReviewedMEDICATIONS  (STANDING):     ROS: Pertinent positives in HPI, all other ROS were reviewed and are negative.      Examination:   Vital Signs Last 24 Hrs  T(C): 36.8 (25 Dec 2023 09:35), Max: 36.8 (25 Dec 2023 08:35)  T(F): 98.2 (25 Dec 2023 09:35), Max: 98.2 (25 Dec 2023 08:35)  HR: 75 (25 Dec 2023 11:35) (69 - 96)  BP: 154/94 (25 Dec 2023 11:35) (123/76 - 154/94)  BP(mean): 114 (25 Dec 2023 11:35) (90 - 114)  RR: 15 (25 Dec 2023 11:35) (15 - 18)  SpO2: 100% (25 Dec 2023 11:35) (97% - 100%)    Parameters below as of 25 Dec 2023 11:35  Patient On (Oxygen Delivery Method): room air      General: Cooperative, NAD   NECK: supple, no masses  ENT: Normal hearing   Vascular : no carotid bruits,   Lungs: CTAB  Chest: RRR, no murmurs  Extremities: nontender, no edema  Musculoskeletal: no adventitious movements, no joint stiffness  Skin: no rash    Neurological Examination:  NIHSS:6  MS: AOx3. Appropriately interactive, normal affect. Speech fluent w/o paraphasic error, repetition, naming  intact   CN: ? Left lower field cut, PERLL, EOMI, V1-3 decreased on left, face symmetric, hearing intact, palate elevates symmetrically, tongue midline, SCM equal bilaterally  Motor: normal bulk and tone, no tremor, rigidity or bradykinesia.  LUE 3/5, LL# 4/5, on right 5/5   Sens: reduced to light touch on left, extinguishes on LLE.    Reflexes: 2/4 all over, downgoing toes b/l  Coord:  Limited on left due to weakness no dysmetria on right   Gait: Cannot test    Labs: Reviewed  Comprehensive Metabolic Panel (12.25.23 @ 09:11)   Sodium: 140 mmol/L  Potassium: 4.1 mmol/L  Chloride: 110 mmol/L  Carbon Dioxide: 28 mmol/L  Anion Gap: 2 mmol/L  Blood Urea Nitrogen: 14 mg/dL  Creatinine: 0.82 mg/dL  Glucose: 113 mg/dL  Calcium: 8.7 mg/dL  Protein Total: 7.6 gm/dL  Albumin: 3.9 g/dL  Bilirubin Total: 0.5 mg/dL  Alkaline Phosphatase: 61 U/L  Aspartate Aminotransferase (AST/SGOT): 24 U/L  Alanine Aminotransferase (ALT/SGPT): 21 U/L  eGFR: 82:      Complete Blood Count + Automated Diff (12.25.23 @ 09:11)   WBC Count: 6.39 K/uL  RBC Count: 4.40 M/uL  Hemoglobin: 12.9 g/dL  Hematocrit: 38.9 %  Mean Cell Volume: 88.4 fl  Mean Cell Hemoglobin: 29.3 pg  Mean Cell Hemoglobin Conc: 33.2 gm/dL  Red Cell Distrib Width: 12.8 %  Platelet Count - Automated: 221 K/uL    Activated Partial Thromboplastin Time: 35.0:   Prothrombin Time, Plasma: 11.3 sec (12.25.23 @ 09:11)         < from: CT Brain Perfusion Maps Stroke (12.25.23 @ 09:31) >  PROCEDURE DATE:  12/25/2023          INTERPRETATION:  Clinical indication: Code stroke for left-sided   weakness, c/o headache, chest pain, SOB, left arm pain, nausea,   dizziness, cough that started around 4am today. Pt states hx of enlarged   heart and HTN and has not been able to take her medications for past 3   days due to not able to get a refill    CT PERFUSION:    After the intravenous administration of 50 cc of Omnipaque 350 serial   thin sections were obtained through thebrain for the purposes of   evaluating CT perfusion. Raw data was sent to the rapid ischemia view   software for postprocessing.    No core infarct or delayed mean transit time is identified.    CBF<30% volume: 0 ml  Tmax> 6.0s volume: 0 ml  Mismatchvolume: 0 ml  Mismatch ratio: none      CTA head and neck:      After the intravenous power injection of 90 cc of Omnipaque 350 using a   bolus juarez timing run serial thin sections were obtained through the   neck from the thoracic inlet through theintracranial circulation   centered at the [id number] on a multislice CT scanner reformatted   with coronal and sagittal 2 D-MIP projections, including 3 D   reconstructions using a separate 3D [employer] software workstation.A total   of  140 cc of Omnipaque were intravenously injected.  0 cc were discarded.    There is calcification along the aortic arch. The origins of the carotid   and vertebral arteries are normal. The left vertebral artery is dominant.   The carotid bifurcations demonstrate no significant stenosis.    The distal vertebral arteries are well identified as are the   posterior-inferior cerebellar arteries bilaterally. The region of the   vertebral basilar junction is normal. The basilar artery is normal. The   posterior cerebral and superior cerebellar arteries are normal. Left   posterior communicating artery is identified.    Evaluation of the carotid arteries demonstrate normal appearance to the   distal cervical, petrous cavernous and supraclinoid internal carotid  arteries. The anterior cerebral arteries anterior communicating artery   and middle cerebral arteries are normal.    The normal intracranial venous circulation is identified. The left   transverse sinus is dominant. The superior sagittal sinus, internal   cerebral veins, vein of Bao, straight sinus, transverse sinuses,   sigmoid sinuses and internal jugular veins are normal. Cortical veins are   normal.        IMPRESSION: Unremarkable CTA of the head and neck. No large vessel   occlusion. Normal CT perfusion.    < end of copied text >      No IV tpa given because OOTW  Total Critical Care Time spent:

## 2023-12-25 NOTE — ED PROVIDER NOTE - PROVIDER ASSESSMENT_DATE AND TIME
HISTORY OF PRESENT ILLNESS     HPI  Nurse's note has been reviewed and I agree with its content. SUBJECTIVE:  Beck Hilario is a 59year old female presenting with complaint of sinus congestion and cough. Patient reports symptoms present for the past week. Patient notes low grade temps and increased facial pain and pressure. She is concerned she may have a sinus infection. PAST MEDICAL, FAMILY AND SOCIAL HISTORY     The following histories were personally reviewed and updated. Current medications, Allergies, Past Medical History and Social History    REVIEW OF SYSTEMS     Review of Systems   Constitutional: Positive for fever. Negative for chills. HENT: Positive for congestion, postnasal drip, sinus pain and sinus pressure. Negative for ear pain and sore throat. Eyes: Negative for discharge. Respiratory: Positive for cough. Negative for shortness of breath. Cardiovascular: Negative for chest pain. Gastrointestinal: Negative for nausea and vomiting. Skin: Negative for rash. Neurological: Negative for headaches. Hematological: Negative for adenopathy. PHYSICAL EXAM     Physical Exam   Constitutional: She appears well-developed and well-nourished. No distress. HENT:   Head: Normocephalic and atraumatic. Right Ear: Tympanic membrane and external ear normal.   Left Ear: Tympanic membrane and external ear normal.   Nose: Mucosal edema present. Right sinus exhibits maxillary sinus tenderness. Left sinus exhibits maxillary sinus tenderness. Mouth/Throat: Mucous membranes are normal. Posterior oropharyngeal erythema (PND noted) present. Eyes: Conjunctivae are normal. Right eye exhibits no discharge. Left eye exhibits no discharge. Neck: Normal range of motion. Neck supple. Cardiovascular: Normal rate, regular rhythm and normal heart sounds. No murmur heard. Pulmonary/Chest: Effort normal and breath sounds normal.   Lymphadenopathy:     She has no cervical adenopathy. Pérez Gutierrez PA-C.     Collaborating Physician: Tere Juarez MD 25-Dec-2023 08:38

## 2023-12-25 NOTE — CONSULT NOTE ADULT - ASSESSMENT
58 year old woman hx of HTN, c/o headache, left sided numbness, weakness since this AM, not TNK candidate due to OOTW. CT head, Ct angios negative for acute findings.  r/o right CVA, ? complicated migraine.  Suggest:  admit to telemetry  neuro checks q 4  check lipids, HGA1C  load with Plavix 300 mg x 1, then 75 mg qd, x 21 days  asa 81 mg po qd  high dose statin  f/u B/p.   allow for permissive HTN, systolic <190  swallow eval  Pt/OT eval  2D echo  MR head wo      Discussed with Dr. Donnelly, Dr. Rogers

## 2023-12-25 NOTE — SWALLOW BEDSIDE ASSESSMENT ADULT - SWALLOW EVAL: PROGNOSIS
2) The pt is alert and interactive. She is Luxembourgish speaking. Her receptive language abilities were preserved and she was able to verbalize during communicative probes without evidence of a primary motor speech or primary linguistic pathology. Pt is communicatively competent and at reported speech-language baseline. 2) The pt is alert and interactive. She is Lao speaking. Her receptive language abilities were preserved and she was able to verbalize during communicative probes without evidence of a primary motor speech or primary linguistic pathology. Pt is communicatively competent and at reported speech-language baseline.

## 2023-12-25 NOTE — H&P ADULT - HISTORY OF PRESENT ILLNESS
59 yo Hebrew-speaking F with a PMH of HTN and HLD who presents with acute L sided weakness. She was sleeping at night when at about 2AM, she felt a sharp mid-sternal CP, 8/10 in severity, radiating to the back, with associated SOB, and also noticed L arm weakness. She felt her L side was numb as well. She also felt at the time L sided headaches. She also noticed blurry L sided vision. The symptoms all worsened until she got to the ED and received aspirin. She states she has been getting similar symptoms for months "when my blood pressure goes high." She has been taking losartan but ran out of her medication 2 days ago. She denies N/V, diarrhea, constipation, or dizziness. She is unsure if she had some ringing in her ears earlier. She notes she was told she had a "big heart," but never had anything done for it.    In the ED, she was given aspirin 324 mg PO x1. 57 yo Azeri-speaking F with a PMH of HTN and HLD who presents with acute L sided weakness. She was sleeping at night when at about 2AM, she felt a sharp mid-sternal CP, 8/10 in severity, radiating to the back, with associated SOB, and also noticed L arm weakness. She felt her L side was numb as well. She also felt at the time L sided headaches. She also noticed blurry L sided vision. The symptoms all worsened until she got to the ED and received aspirin. She states she has been getting similar symptoms for months "when my blood pressure goes high." She has been taking losartan but ran out of her medication 2 days ago. She denies N/V, diarrhea, constipation, or dizziness. She is unsure if she had some ringing in her ears earlier. She notes she was told she had a "big heart," but never had anything done for it.    In the ED, she was given aspirin 324 mg PO x1.

## 2023-12-25 NOTE — H&P ADULT - NSICDXFAMILYHX_GEN_ALL_CORE_FT
FAMILY HISTORY:  Father  Still living? Unknown  Family history of heart attack, Age at diagnosis: Age Unknown    Sibling  Still living? Unknown  Family history of heart attack, Age at diagnosis: Age Unknown

## 2023-12-25 NOTE — ED PROVIDER NOTE - CLINICAL SUMMARY MEDICAL DECISION MAKING FREE TEXT BOX
57 yo female with L sided substernal chest pain, and LUE/LLE weakness. After HPI and physical were performed, code stroke was activated at 0905. No TNK at this time due to outside of window for treatment. 59 yo female with L sided substernal chest pain, and LUE/LLE weakness. After HPI and physical were performed, code stroke was activated at 0905. No TNK at this time due to outside of window for treatment.    Andrzej WOOD: unable to reach stroke neurologist at tele stroke; spoke with Dr. Alfaro- aware- will see patient; endorsed to Dr. Rogers for admission.

## 2023-12-25 NOTE — ED ADULT NURSE NOTE - NSFALLRISKINTERV_ED_ALL_ED
Assistance OOB with selected safe patient handling equipment if applicable/Assistance with ambulation/Communicate fall risk and risk factors to all staff, patient, and family/Encourage patient to sit up slowly, dangle for a short time, stand at bedside before walking/Monitor gait and stability/Orthostatic vital signs/Provide visual cue: yellow wristband, yellow gown, etc/Reinforce activity limits and safety measures with patient and family/Call bell, personal items and telephone in reach/Instruct patient to call for assistance before getting out of bed/chair/stretcher/Non-slip footwear applied when patient is off stretcher/[place] to call system/Physically safe environment - no spills, clutter or unnecessary equipment/Purposeful Proactive Rounding/Room/bathroom lighting operational, light cord in reach Assistance OOB with selected safe patient handling equipment if applicable/Assistance with ambulation/Communicate fall risk and risk factors to all staff, patient, and family/Encourage patient to sit up slowly, dangle for a short time, stand at bedside before walking/Monitor gait and stability/Orthostatic vital signs/Provide visual cue: yellow wristband, yellow gown, etc/Reinforce activity limits and safety measures with patient and family/Call bell, personal items and telephone in reach/Instruct patient to call for assistance before getting out of bed/chair/stretcher/Non-slip footwear applied when patient is off stretcher/[place] to call system/Physically safe environment - no spills, clutter or unnecessary equipment/Purposeful Proactive Rounding/Room/bathroom lighting operational, light cord in reach

## 2023-12-25 NOTE — ED ADULT NURSE NOTE - NSFALLRISK_ED_ALL_ED
Discharged in good condition after discharge instructions reviewed with pt in detail, pt verbalizes understanding of all. Ambulated out with steady gait independently with follow up instructions in hand.      No

## 2023-12-25 NOTE — H&P ADULT - NSHPREVIEWOFSYSTEMS_GEN_ALL_CORE
Gen: + malaise. Negative for fevers or chills  Eyes: + blurred vision  ENT: No vertigo. Unclear tinnitus  Resp: + dyspnea. No cough, wheezing, hemoptysis, or orthopnea  CV: + chest pain. No dyspnea on exertion or palpitations  GI: no nausea, vomiting, abdominal pain, diarrhea, constipation, melena, or hematochezia  : no dysuria, hematuria, discharge, or incontinence  MSK: no arthralgias, joint swelling, or myalgias  Neuro: no focal deficits, confusion, dizziness, tremors, or seizures  Skin: no rash, lesions, or edema
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
## 2023-12-25 NOTE — SWALLOW BEDSIDE ASSESSMENT ADULT - NS SPL SWALLOW CLINIC TRIAL FT
The patient exhibited Oropharyngeal Swallowing integrity which subjectively appeared to be within functional parameters for age. Bolus formation/transfer were mechanically functional for age, swallow was triggered in an acceptable time frame for age, and laryngeal lift on palpation during swallowing trials was felt to be functional for age as well. NO behavioral aspiration signs exhibited. No change in O2 sats noted. Odynophagia was denied.

## 2023-12-25 NOTE — SWALLOW BEDSIDE ASSESSMENT ADULT - SWALLOW EVAL: DIAGNOSIS
1) The pt exhibits Oropharyngeal Swallowing integrity which subjectively appeared to be within functional parameters for age. Bolus formation/transfer were mechanically functional for age, swallow was triggered in an acceptable time frame for age, and laryngeal lift on palpation during swallowing trials was felt to be functional for age as well. NO behavioral aspiration signs exhibited. No change in O2 sats noted. Odynophagia was denied.

## 2023-12-25 NOTE — H&P ADULT - NSHPPHYSICALEXAM_GEN_ALL_CORE
Vital Signs Last 24 Hrs  T(C): 36.8 (25 Dec 2023 09:35), Max: 36.8 (25 Dec 2023 08:35)  T(F): 98.2 (25 Dec 2023 09:35), Max: 98.2 (25 Dec 2023 08:35)  HR: 75 (25 Dec 2023 11:35) (69 - 96)  BP: 154/94 (25 Dec 2023 11:35) (123/76 - 154/94)  BP(mean): 114 (25 Dec 2023 11:35) (90 - 114)  RR: 15 (25 Dec 2023 11:35) (15 - 18)  SpO2: 100% (25 Dec 2023 11:35) (97% - 100%)    BP L arm sitting, 170/102  BP R arm sitting, 168/94    GENERAL: No acute distress  HEENT: PERRL, EOMI, MMM, no oropharyngeal lesions  NECK: supple, no stiffness, no JVD, no thyromegaly  PULM: respirations non-labored, clear to auscultation bilaterally, no rales, rhonchi, or wheezes  CV: regular rate and rhythm, no murmurs, gallops, or rubs  GI: abdomen soft, nontender, nondistended, no masses felt, normal bowel sounds  MSK: strength 5/5 RUE/RLE but 2/5 LUE/LLEs. No joint swelling, erythema, or warmth.  LYMPH: no anterior cervical, posterior cervical, supraclavicular, or inguinal lymphadenopathy  NEURO: A&Ox3, no tremors. Sensation markedly decreased throughout L side, including CN V1/2/3, LUE, and LLE. Otherwise, CNs II-IV and VI-XII intact bilaterally. Proprioception decreased LLE (toe up-or-down)  SKIN: no rashes, lesions, or edema

## 2023-12-25 NOTE — ED ADULT NURSE NOTE - NS ED NURSE LEVEL OF CONSCIOUSNESS ORIENTATION
It was a pleasure taking care of you today.  I've included a brief summary of our discussion and care plan from today's visit below.  Please review this information with your primary care provider.  ______________________________________________________________________    My recommendations are summarized as follows:    -- thoracic surgery referral- please call us if you have not heard about an appointment!     GERD Lifestyle Modifications  -- Avoid foods high in fat or high fructose corn syrup  -- do not eat within three hours of laying down or going to bed  -- raise the head of your bed 6-8 inches  -- avoid alcohol  -- aim for BMI of less than <25 and lose extra weight as needed    -- ok to continue pantoprazole- if worsening symptoms of neuropathy switch to Pepcid     --Decrease dicyclomine to 2 times a day for 2 weeks, then to 1 time a day for 2 weeks.  If  symptoms do not worsen, stop the medication entirely.    --Continue nortriptyline    --Continue recommendations as was discussed with our dietitian    -- Consider trial of glucose tabs (take 1) for low blood sugar episodes.     Return to GI Clinic in 2-3 months to review your progress.    ______________________________________________________________________    Who do I call with any questions after my visit?  Please be in touch if there are any further questions that arise following today's visit.  There are multiple ways to contact your gastroenterology care team.        During business hours, you may reach a Gastroenterology nurse at [phone]      To schedule or reschedule an appointment, please call [phone].       You can always send a secure message through [employer].  [employer] [employer] messages are answered by your nurse or doctor typically within 24 hours.  Please allow extra time on weekends and holidays.        For urgent/emergent questions after business hours, you may reach the on-call GI Fellow by contacting the [facility]  at [phone].     How will I get the results of any tests ordered?    You will receive all of your results.  If you have signed up for [employer], any tests ordered at your visit will be available to you after your physician reviews them.  Typically this takes 1-2 weeks.  If there are urgent results that require a change in your care plan, your physician or nurse will call you to discuss the next steps.      What is [facility]?  [employer] [facility] is a secure way for you to access all of your healthcare records from the [facility].  It is a web based computer program, so you can sign on to it from any location.  It also allows you to send secure messages to your care team.  I recommend signing up for [employer] access if you have not already done so and are comfortable with using a computer.      How to I schedule a follow-up visit?  If you did not schedule a follow-up visit today, please call [phone] to schedule a follow-up office visit.        Sincerely,    Adrienne Pedroza PA-C  Division of Gastroenterology, Hepatology & Nutrition  [facility]       Oriented - self; Oriented - place; Oriented - time

## 2023-12-26 LAB
A1C WITH ESTIMATED AVERAGE GLUCOSE RESULT: 5.9 % — HIGH (ref 4–5.6)
A1C WITH ESTIMATED AVERAGE GLUCOSE RESULT: 5.9 % — HIGH (ref 4–5.6)
ANION GAP SERPL CALC-SCNC: 3 MMOL/L — LOW (ref 5–17)
ANION GAP SERPL CALC-SCNC: 3 MMOL/L — LOW (ref 5–17)
BUN SERPL-MCNC: 14 MG/DL — SIGNIFICANT CHANGE UP (ref 7–23)
BUN SERPL-MCNC: 14 MG/DL — SIGNIFICANT CHANGE UP (ref 7–23)
CALCIUM SERPL-MCNC: 9 MG/DL — SIGNIFICANT CHANGE UP (ref 8.5–10.1)
CALCIUM SERPL-MCNC: 9 MG/DL — SIGNIFICANT CHANGE UP (ref 8.5–10.1)
CHLORIDE SERPL-SCNC: 110 MMOL/L — HIGH (ref 96–108)
CHLORIDE SERPL-SCNC: 110 MMOL/L — HIGH (ref 96–108)
CHOLEST SERPL-MCNC: 149 MG/DL — SIGNIFICANT CHANGE UP
CHOLEST SERPL-MCNC: 149 MG/DL — SIGNIFICANT CHANGE UP
CO2 SERPL-SCNC: 28 MMOL/L — SIGNIFICANT CHANGE UP (ref 22–31)
CO2 SERPL-SCNC: 28 MMOL/L — SIGNIFICANT CHANGE UP (ref 22–31)
CREAT SERPL-MCNC: 0.75 MG/DL — SIGNIFICANT CHANGE UP (ref 0.5–1.3)
CREAT SERPL-MCNC: 0.75 MG/DL — SIGNIFICANT CHANGE UP (ref 0.5–1.3)
CULTURE RESULTS: SIGNIFICANT CHANGE UP
CULTURE RESULTS: SIGNIFICANT CHANGE UP
EGFR: 91 ML/MIN/1.73M2 — SIGNIFICANT CHANGE UP
EGFR: 91 ML/MIN/1.73M2 — SIGNIFICANT CHANGE UP
ESTIMATED AVERAGE GLUCOSE: 123 MG/DL — HIGH (ref 68–114)
ESTIMATED AVERAGE GLUCOSE: 123 MG/DL — HIGH (ref 68–114)
GLUCOSE SERPL-MCNC: 107 MG/DL — HIGH (ref 70–99)
GLUCOSE SERPL-MCNC: 107 MG/DL — HIGH (ref 70–99)
HCT VFR BLD CALC: 39.4 % — SIGNIFICANT CHANGE UP (ref 34.5–45)
HCT VFR BLD CALC: 39.4 % — SIGNIFICANT CHANGE UP (ref 34.5–45)
HCV AB S/CO SERPL IA: 0.1 S/CO — SIGNIFICANT CHANGE UP (ref 0–0.99)
HCV AB S/CO SERPL IA: 0.1 S/CO — SIGNIFICANT CHANGE UP (ref 0–0.99)
HCV AB SERPL-IMP: SIGNIFICANT CHANGE UP
HCV AB SERPL-IMP: SIGNIFICANT CHANGE UP
HDLC SERPL-MCNC: 51 MG/DL — SIGNIFICANT CHANGE UP
HDLC SERPL-MCNC: 51 MG/DL — SIGNIFICANT CHANGE UP
HGB BLD-MCNC: 12.7 G/DL — SIGNIFICANT CHANGE UP (ref 11.5–15.5)
HGB BLD-MCNC: 12.7 G/DL — SIGNIFICANT CHANGE UP (ref 11.5–15.5)
LIPID PNL WITH DIRECT LDL SERPL: 83 MG/DL — SIGNIFICANT CHANGE UP
LIPID PNL WITH DIRECT LDL SERPL: 83 MG/DL — SIGNIFICANT CHANGE UP
MAGNESIUM SERPL-MCNC: 2.9 MG/DL — HIGH (ref 1.6–2.6)
MAGNESIUM SERPL-MCNC: 2.9 MG/DL — HIGH (ref 1.6–2.6)
MCHC RBC-ENTMCNC: 28.8 PG — SIGNIFICANT CHANGE UP (ref 27–34)
MCHC RBC-ENTMCNC: 28.8 PG — SIGNIFICANT CHANGE UP (ref 27–34)
MCHC RBC-ENTMCNC: 32.2 GM/DL — SIGNIFICANT CHANGE UP (ref 32–36)
MCHC RBC-ENTMCNC: 32.2 GM/DL — SIGNIFICANT CHANGE UP (ref 32–36)
MCV RBC AUTO: 89.3 FL — SIGNIFICANT CHANGE UP (ref 80–100)
MCV RBC AUTO: 89.3 FL — SIGNIFICANT CHANGE UP (ref 80–100)
NON HDL CHOLESTEROL: 98 MG/DL — SIGNIFICANT CHANGE UP
NON HDL CHOLESTEROL: 98 MG/DL — SIGNIFICANT CHANGE UP
PHOSPHATE SERPL-MCNC: 3.5 MG/DL — SIGNIFICANT CHANGE UP (ref 2.5–4.5)
PHOSPHATE SERPL-MCNC: 3.5 MG/DL — SIGNIFICANT CHANGE UP (ref 2.5–4.5)
PLATELET # BLD AUTO: 221 K/UL — SIGNIFICANT CHANGE UP (ref 150–400)
PLATELET # BLD AUTO: 221 K/UL — SIGNIFICANT CHANGE UP (ref 150–400)
POTASSIUM SERPL-MCNC: 4 MMOL/L — SIGNIFICANT CHANGE UP (ref 3.5–5.3)
POTASSIUM SERPL-MCNC: 4 MMOL/L — SIGNIFICANT CHANGE UP (ref 3.5–5.3)
POTASSIUM SERPL-SCNC: 4 MMOL/L — SIGNIFICANT CHANGE UP (ref 3.5–5.3)
POTASSIUM SERPL-SCNC: 4 MMOL/L — SIGNIFICANT CHANGE UP (ref 3.5–5.3)
RBC # BLD: 4.41 M/UL — SIGNIFICANT CHANGE UP (ref 3.8–5.2)
RBC # BLD: 4.41 M/UL — SIGNIFICANT CHANGE UP (ref 3.8–5.2)
RBC # FLD: 12.9 % — SIGNIFICANT CHANGE UP (ref 10.3–14.5)
RBC # FLD: 12.9 % — SIGNIFICANT CHANGE UP (ref 10.3–14.5)
SODIUM SERPL-SCNC: 141 MMOL/L — SIGNIFICANT CHANGE UP (ref 135–145)
SODIUM SERPL-SCNC: 141 MMOL/L — SIGNIFICANT CHANGE UP (ref 135–145)
SPECIMEN SOURCE: SIGNIFICANT CHANGE UP
SPECIMEN SOURCE: SIGNIFICANT CHANGE UP
TRIGL SERPL-MCNC: 75 MG/DL — SIGNIFICANT CHANGE UP
TRIGL SERPL-MCNC: 75 MG/DL — SIGNIFICANT CHANGE UP
TSH SERPL-MCNC: 5.2 UU/ML — HIGH (ref 0.34–4.82)
TSH SERPL-MCNC: 5.2 UU/ML — HIGH (ref 0.34–4.82)
WBC # BLD: 5.66 K/UL — SIGNIFICANT CHANGE UP (ref 3.8–10.5)
WBC # BLD: 5.66 K/UL — SIGNIFICANT CHANGE UP (ref 3.8–10.5)
WBC # FLD AUTO: 5.66 K/UL — SIGNIFICANT CHANGE UP (ref 3.8–10.5)
WBC # FLD AUTO: 5.66 K/UL — SIGNIFICANT CHANGE UP (ref 3.8–10.5)

## 2023-12-26 PROCEDURE — 72141 MRI NECK SPINE W/O DYE: CPT | Mod: 26

## 2023-12-26 PROCEDURE — 93306 TTE W/DOPPLER COMPLETE: CPT | Mod: 26

## 2023-12-26 PROCEDURE — 99221 1ST HOSP IP/OBS SF/LOW 40: CPT

## 2023-12-26 PROCEDURE — 70551 MRI BRAIN STEM W/O DYE: CPT | Mod: 26

## 2023-12-26 PROCEDURE — 99233 SBSQ HOSP IP/OBS HIGH 50: CPT

## 2023-12-26 PROCEDURE — 99232 SBSQ HOSP IP/OBS MODERATE 35: CPT

## 2023-12-26 RX ORDER — ATORVASTATIN CALCIUM 80 MG/1
20 TABLET, FILM COATED ORAL AT BEDTIME
Refills: 0 | Status: DISCONTINUED | OUTPATIENT
Start: 2023-12-26 | End: 2023-12-29

## 2023-12-26 RX ORDER — DEXAMETHASONE 0.5 MG/5ML
4 ELIXIR ORAL EVERY 6 HOURS
Refills: 0 | Status: DISCONTINUED | OUTPATIENT
Start: 2023-12-26 | End: 2023-12-29

## 2023-12-26 RX ORDER — ENOXAPARIN SODIUM 100 MG/ML
40 INJECTION SUBCUTANEOUS EVERY 24 HOURS
Refills: 0 | Status: DISCONTINUED | OUTPATIENT
Start: 2023-12-26 | End: 2023-12-29

## 2023-12-26 RX ORDER — CYCLOBENZAPRINE HYDROCHLORIDE 10 MG/1
5 TABLET, FILM COATED ORAL THREE TIMES A DAY
Refills: 0 | Status: DISCONTINUED | OUTPATIENT
Start: 2023-12-26 | End: 2023-12-29

## 2023-12-26 RX ADMIN — CYCLOBENZAPRINE HYDROCHLORIDE 5 MILLIGRAM(S): 10 TABLET, FILM COATED ORAL at 21:25

## 2023-12-26 RX ADMIN — PANTOPRAZOLE SODIUM 40 MILLIGRAM(S): 20 TABLET, DELAYED RELEASE ORAL at 05:27

## 2023-12-26 RX ADMIN — Medication 4 MILLIGRAM(S): at 16:09

## 2023-12-26 RX ADMIN — CYCLOBENZAPRINE HYDROCHLORIDE 5 MILLIGRAM(S): 10 TABLET, FILM COATED ORAL at 16:09

## 2023-12-26 RX ADMIN — Medication 81 MILLIGRAM(S): at 10:49

## 2023-12-26 RX ADMIN — Medication 4 MILLIGRAM(S): at 21:25

## 2023-12-26 RX ADMIN — CLOPIDOGREL BISULFATE 75 MILLIGRAM(S): 75 TABLET, FILM COATED ORAL at 10:49

## 2023-12-26 RX ADMIN — ENOXAPARIN SODIUM 40 MILLIGRAM(S): 100 INJECTION SUBCUTANEOUS at 16:10

## 2023-12-26 RX ADMIN — ATORVASTATIN CALCIUM 20 MILLIGRAM(S): 80 TABLET, FILM COATED ORAL at 21:25

## 2023-12-26 RX ADMIN — LOSARTAN POTASSIUM 50 MILLIGRAM(S): 100 TABLET, FILM COATED ORAL at 10:50

## 2023-12-26 NOTE — PROGRESS NOTE ADULT - ASSESSMENT
57 yo Serbian-speaking F with a PMH of HTN and HLD who presents with acute LUE/LLE weakness, chest pain, and headache in the setting of uncontrolled HTN, with persistent neurological deficits, r/o CVA.    # Left arm and Leg weakness: No CVA  likely sec to multiple level DDD and disc bulges + T1-2: Left-sided nerve root sleeve cyst vs complex migraine  flexeril + decadron  neuro sc consult  admit   - EKG non-pathological, no STEMI  - stop Aspirin and Plavix   - Increasing patient's statin from 20 mg to 80 mg (high-dose)  - MRI brain without contrast  - BP control  - Check TTE  - Trend troponins x3  - Appreciate neurology recs    #Uncontrolled Hypertension  - Patient had ran out of meds x 2 days, BP was normal upon ED arrival but elevated multiple times after and on my exam  - Restart losartan 50 mg QD for now  - If not controlled, consider calcium channel blocker addition  - Cardiac workup including TTE    #Atypical Chest Pain  - Patient's CP is radiating to the back  - Troponins normal x2, repeat at least one more  - Suspect in the setting of HTN, less likely ischemic but suspect patient may have cardiomyopathy  - BPs equal on both sides and sxs improved after aspirin, not c/w dissection  - Check TTE  - Vital signs Q4H  - BP control    #Prophylactic Measure  - DVT PPX: lovenox  - Diet: DASH  - Dispo: pending evaluation above   59 yo Wolof-speaking F with a PMH of HTN and HLD who presents with acute LUE/LLE weakness, chest pain, and headache in the setting of uncontrolled HTN, with persistent neurological deficits, r/o CVA.    # Left arm and Leg weakness: No CVA  likely sec to multiple level DDD and disc bulges + T1-2: Left-sided nerve root sleeve cyst vs complex migraine  flexeril + decadron  neuro sc consult  admit   - EKG non-pathological, no STEMI  - stop Aspirin and Plavix   - Increasing patient's statin from 20 mg to 80 mg (high-dose)  - MRI brain without contrast  - BP control  - Check TTE  - Trend troponins x3  - Appreciate neurology recs    #Uncontrolled Hypertension  - Patient had ran out of meds x 2 days, BP was normal upon ED arrival but elevated multiple times after and on my exam  - Restart losartan 50 mg QD for now  - If not controlled, consider calcium channel blocker addition  - Cardiac workup including TTE    #Atypical Chest Pain  - Patient's CP is radiating to the back  - Troponins normal x2, repeat at least one more  - Suspect in the setting of HTN, less likely ischemic but suspect patient may have cardiomyopathy  - BPs equal on both sides and sxs improved after aspirin, not c/w dissection  - Check TTE  - Vital signs Q4H  - BP control    #Prophylactic Measure  - DVT PPX: lovenox  - Diet: DASH  - Dispo: pending evaluation above

## 2023-12-26 NOTE — CONSULT NOTE ADULT - SUBJECTIVE AND OBJECTIVE BOX
HPI:  57 yo Belarusian-speaking F with a PMH of HTN and HLD who presents with acute L sided weakness. She was sleeping at night when at about 2AM, she felt a sharp mid-sternal CP, 8/10 in severity, radiating to the back, with associated SOB, and also noticed L arm weakness. She felt her L side was numb as well. She also felt at the time L sided headaches. She also noticed blurry L sided vision. The symptoms all worsened until she got to the ED and received aspirin. She states she has been getting similar symptoms for months "when my blood pressure goes high." She has been taking losartan but ran out of her medication 2 days ago. She denies N/V, diarrhea, constipation, or dizziness. She is unsure if she had some ringing in her ears earlier. She notes she was told she had a "big heart," but never had anything done for it.  In the ED, she was given aspirin 324 mg PO x1.    Neurosurgery was consulted for cervical stenosis on MRI. Patient seen and examined on [facility]  used ID#[id number]. Awake/alert she reports improvement in symptoms since admission, sensation has returned to normal on left side. She still c/o L sided weakness. Reports history of neck pain for about 3 years, that would occasionally radiate into her shoulders and to the back of her head. She denies pain radiating down her arms/legs, denies other back pain. Reports occasional urinary incontinence due to urgency and urinary frequency. Denies bowel incontinence, or saddle parathesias. Denies back injury, gait difficulty or dropping objects.    PAST MEDICAL & SURGICAL HISTORY:  HTN (hypertension)      HLD (hyperlipidemia)      Kidney cysts        FAMILY HISTORY:  Family history of heart attack (Father, Sibling)          Social Hx:  Nonsmoker, no drug or alcohol use. Worked as hairdresser/ for 20 years.    MEDICATIONS  (STANDING):  atorvastatin 20 milliGRAM(s) Oral at bedtime  cyclobenzaprine 5 milliGRAM(s) Oral three times a day  dexAMETHasone  Injectable 4 milliGRAM(s) IV Push every 6 hours  enoxaparin Injectable 40 milliGRAM(s) SubCutaneous every 24 hours  losartan 50 milliGRAM(s) Oral daily  pantoprazole    Tablet 40 milliGRAM(s) Oral before breakfast       Allergies  No Known Allergies    Intolerances        ROS: Pertinent positives in HPI, all other ROS were reviewed and are negative.      Vital Signs Last 24 Hrs  T(C): 36.8 (26 Dec 2023 16:29), Max: 36.8 (26 Dec 2023 08:40)  T(F): 98.3 (26 Dec 2023 16:29), Max: 98.3 (26 Dec 2023 08:40)  HR: 73 (26 Dec 2023 16:29) (65 - 77)  BP: 128/81 (26 Dec 2023 16:29) (105/53 - 134/79)  BP(mean): 96 (26 Dec 2023 10:47) (96 - 96)  RR: 20 (26 Dec 2023 16:29) (19 - 20)  SpO2: 97% (26 Dec 2023 16:29) (95% - 97%)    Parameters below as of 26 Dec 2023 16:29  Patient On (Oxygen Delivery Method): room air          PHYSICAL EXAM:  Constitutional: awake and alert  HEENT: PERRLA, EOMI,   Neck: Supple  Respiratory: Breath sounds are clear bilaterally  Cardiovascular: S1 and S2, regular rhythm  Gastrointestinal: soft, nontender  Extremities:  trace edema b/l LE  Musculoskeletal: + TTP cspine midline, b/l traps  Skin: No rashes    Neurological exam:  HF: A x O x 3. Appropriately interactive, normal affect. Speech fluent, No Aphasia or paraphasic errors. Naming /repetition intact   CN: IVETH, EOMI, no NLFD, tongue midline, Palate moves equally, SCM equal bilaterally  Motor: No pronator drift, Strength 5/5 on right.   LUE delt//tricep 3/5, bicep 4-/5  LLE IP 4/5 QD/HS 4-/5 DF 4-/5, PF 5/5  Sens: Intact to light touch  Reflexes: Symmetric and normal, downgoing toes b/l, no clonus, no hoffmans  Coord:  No FNFA, dysmetria, LASHAWN intact   Gait/Balance: Cannot test        Labs:                        12.7   5.66  )-----------( 221      ( 26 Dec 2023 06:59 )             39.4     12-26    141  |  110<H>  |  14  ----------------------------<  107<H>  4.0   |  28  |  0.75    Ca    9.0      26 Dec 2023 06:59  Phos  3.5     12-26  Mg     2.9     12-26    TPro  7.6  /  Alb  3.9  /  TBili  0.5  /  DBili  x   /  AST  24  /  ALT  21  /  AlkPhos  61  12-25 12-26 Chol 149 LDL -- HDL 51 Trig 75  PT/INR - ( 25 Dec 2023 09:11 )   PT: 11.3 sec;   INR: 1.00 ratio         PTT - ( 25 Dec 2023 09:11 )  PTT:35.0 sec    Radiology report:  MR Cervical Spine No Cont (12.26.23 @ 10:14)  IMPRESSION: Degenerative changes as described above.      MR Head No Cont (12.26.23 @ 10:14)  IMPRESSION:  No hydrocephalus, mass effect, acute intracranial hemorrhage, vasogenic   edema, restricted diffusion, or acute territorial infarct.         HPI:  57 yo Armenian-speaking F with a PMH of HTN and HLD who presents with acute L sided weakness. She was sleeping at night when at about 2AM, she felt a sharp mid-sternal CP, 8/10 in severity, radiating to the back, with associated SOB, and also noticed L arm weakness. She felt her L side was numb as well. She also felt at the time L sided headaches. She also noticed blurry L sided vision. The symptoms all worsened until she got to the ED and received aspirin. She states she has been getting similar symptoms for months "when my blood pressure goes high." She has been taking losartan but ran out of her medication 2 days ago. She denies N/V, diarrhea, constipation, or dizziness. She is unsure if she had some ringing in her ears earlier. She notes she was told she had a "big heart," but never had anything done for it.  In the ED, she was given aspirin 324 mg PO x1.    Neurosurgery was consulted for cervical stenosis on MRI. Patient seen and examined on [facility]  used ID#[id number]. Awake/alert she reports improvement in symptoms since admission, sensation has returned to normal on left side. She still c/o L sided weakness. Reports history of neck pain for about 3 years, that would occasionally radiate into her shoulders and to the back of her head. She denies pain radiating down her arms/legs, denies other back pain. Reports occasional urinary incontinence due to urgency and urinary frequency. Denies bowel incontinence, or saddle parathesias. Denies back injury, gait difficulty or dropping objects.    PAST MEDICAL & SURGICAL HISTORY:  HTN (hypertension)      HLD (hyperlipidemia)      Kidney cysts        FAMILY HISTORY:  Family history of heart attack (Father, Sibling)          Social Hx:  Nonsmoker, no drug or alcohol use. Worked as hairdresser/ for 20 years.    MEDICATIONS  (STANDING):  atorvastatin 20 milliGRAM(s) Oral at bedtime  cyclobenzaprine 5 milliGRAM(s) Oral three times a day  dexAMETHasone  Injectable 4 milliGRAM(s) IV Push every 6 hours  enoxaparin Injectable 40 milliGRAM(s) SubCutaneous every 24 hours  losartan 50 milliGRAM(s) Oral daily  pantoprazole    Tablet 40 milliGRAM(s) Oral before breakfast       Allergies  No Known Allergies    Intolerances        ROS: Pertinent positives in HPI, all other ROS were reviewed and are negative.      Vital Signs Last 24 Hrs  T(C): 36.8 (26 Dec 2023 16:29), Max: 36.8 (26 Dec 2023 08:40)  T(F): 98.3 (26 Dec 2023 16:29), Max: 98.3 (26 Dec 2023 08:40)  HR: 73 (26 Dec 2023 16:29) (65 - 77)  BP: 128/81 (26 Dec 2023 16:29) (105/53 - 134/79)  BP(mean): 96 (26 Dec 2023 10:47) (96 - 96)  RR: 20 (26 Dec 2023 16:29) (19 - 20)  SpO2: 97% (26 Dec 2023 16:29) (95% - 97%)    Parameters below as of 26 Dec 2023 16:29  Patient On (Oxygen Delivery Method): room air          PHYSICAL EXAM:  Constitutional: awake and alert  HEENT: PERRLA, EOMI,   Neck: Supple  Respiratory: Breath sounds are clear bilaterally  Cardiovascular: S1 and S2, regular rhythm  Gastrointestinal: soft, nontender  Extremities:  trace edema b/l LE  Musculoskeletal: + TTP cspine midline, b/l traps  Skin: No rashes    Neurological exam:  HF: A x O x 3. Appropriately interactive, normal affect. Speech fluent, No Aphasia or paraphasic errors. Naming /repetition intact   CN: IVETH, EOMI, no NLFD, tongue midline, Palate moves equally, SCM equal bilaterally  Motor: No pronator drift, Strength 5/5 on right.   LUE delt//tricep 3/5, bicep 4-/5  LLE IP 4/5 QD/HS 4-/5 DF 4-/5, PF 5/5  Sens: Intact to light touch  Reflexes: Symmetric and normal, downgoing toes b/l, no clonus, no hoffmans  Coord:  No FNFA, dysmetria, LASHAWN intact   Gait/Balance: Cannot test        Labs:                        12.7   5.66  )-----------( 221      ( 26 Dec 2023 06:59 )             39.4     12-26    141  |  110<H>  |  14  ----------------------------<  107<H>  4.0   |  28  |  0.75    Ca    9.0      26 Dec 2023 06:59  Phos  3.5     12-26  Mg     2.9     12-26    TPro  7.6  /  Alb  3.9  /  TBili  0.5  /  DBili  x   /  AST  24  /  ALT  21  /  AlkPhos  61  12-25 12-26 Chol 149 LDL -- HDL 51 Trig 75  PT/INR - ( 25 Dec 2023 09:11 )   PT: 11.3 sec;   INR: 1.00 ratio         PTT - ( 25 Dec 2023 09:11 )  PTT:35.0 sec    Radiology report:  MR Cervical Spine No Cont (12.26.23 @ 10:14)  IMPRESSION: Degenerative changes as described above.      MR Head No Cont (12.26.23 @ 10:14)  IMPRESSION:  No hydrocephalus, mass effect, acute intracranial hemorrhage, vasogenic   edema, restricted diffusion, or acute territorial infarct.

## 2023-12-26 NOTE — PHYSICAL THERAPY INITIAL EVALUATION ADULT - PERTINENT HX OF CURRENT PROBLEM, REHAB EVAL
57 yo Polish-speaking F with a PMH of HTN and HLD who presents with acute L sided weakness. She was sleeping at night when at about 2AM, she felt a sharp mid-sternal CP, 8/10 in severity, radiating to the back, with associated SOB, and also noticed L arm weakness. She felt her L side was numb as well. She also felt at the time L sided headaches. She also noticed blurry L sided vision. CTH negative. (R/O) Acute CVA 57 yo Sami-speaking F with a PMH of HTN and HLD who presents with acute L sided weakness. She was sleeping at night when at about 2AM, she felt a sharp mid-sternal CP, 8/10 in severity, radiating to the back, with associated SOB, and also noticed L arm weakness. She felt her L side was numb as well. She also felt at the time L sided headaches. She also noticed blurry L sided vision. CTH negative. (R/O) Acute CVA 59 yo Swedish-speaking F with a PMH of HTN and HLD who presents with acute L sided weakness. She was sleeping at night when at about 2AM, she felt a sharp mid-sternal CP, 8/10 in severity, radiating to the back, with associated SOB, and also noticed L arm weakness. She felt her L side was numb as well. She also felt at the time L sided headaches. She also noticed blurry L sided vision. CTH negative. (R/O) Acute CVA. MRI C-spine: Degenerative changes as described above, MRI head: No hydrocephalus, mass effect, acute intracranial hemorrhage, vasogenic edema, restricted diffusion, or acute territorial infarct. 59 yo Faroese-speaking F with a PMH of HTN and HLD who presents with acute L sided weakness. She was sleeping at night when at about 2AM, she felt a sharp mid-sternal CP, 8/10 in severity, radiating to the back, with associated SOB, and also noticed L arm weakness. She felt her L side was numb as well. She also felt at the time L sided headaches. She also noticed blurry L sided vision. CTH negative. (R/O) Acute CVA. MRI C-spine: Degenerative changes as described above, MRI head: No hydrocephalus, mass effect, acute intracranial hemorrhage, vasogenic edema, restricted diffusion, or acute territorial infarct.

## 2023-12-26 NOTE — PROGRESS NOTE ADULT - SUBJECTIVE AND OBJECTIVE BOX
12/26: c/o left sided neck pain with weakness of left arm and left leg  MRI brain neg for cva  MRI c spine was also ordered; multiple level DDD and disc bulges + T1-2: Left-sided nerve root sleeve cyst  Neuro Sx consult  start flexeril + iv decadron  PTx consult      PHYSICAL EXAM:      Vital Signs Last 24 Hrs  T(C): 36.8 (26 Dec 2023 08:40), Max: 36.8 (25 Dec 2023 16:18)  T(F): 98.3 (26 Dec 2023 08:40), Max: 98.3 (26 Dec 2023 08:40)  HR: 65 (26 Dec 2023 10:47) (65 - 80)  BP: 134/79 (26 Dec 2023 10:47) (105/53 - 134/79)  BP(mean): 96 (26 Dec 2023 10:47) (96 - 96)  RR: 19 (26 Dec 2023 08:40) (19 - 19)  SpO2: 95% (26 Dec 2023 08:40) (95% - 97%)    Constitutional: Weak appearing  HEENT: Atraumatic, JINNY,   Respiratory: Breath Sounds normal, no rhonchi/wheeze  Cardiovascular: N S1S2;   Gastrointestinal: Abdomen soft, non tender, Bowel Sounds present  Extremities: No edema, peripheral pulses present  Neurological: AAO x 3, left arm 4/5; left leg 4/5  Skin: Non cellulitic, no rash, ulcers  Lymph Nodes: No lymphadenopathy noted  Back: No CVA tenderness   Musculoskeletal: non tender  Breasts: Deferred  Genitourinary: deferred  Rectal: Deferred    All Labs/EKG/Radiology/Meds reviewed by me                          12.7   5.66  )-----------( 221      ( 26 Dec 2023 06:59 )             39.4       CBC Full  -  ( 26 Dec 2023 06:59 )  WBC Count : 5.66 K/uL  RBC Count : 4.41 M/uL  Hemoglobin : 12.7 g/dL  Hematocrit : 39.4 %  Platelet Count - Automated : 221 K/uL  Mean Cell Volume : 89.3 fl  Mean Cell Hemoglobin : 28.8 pg  Mean Cell Hemoglobin Concentration : 32.2 gm/dL  Auto Neutrophil # : x  Auto Lymphocyte # : x  Auto Monocyte # : x  Auto Eosinophil # : x  Auto Basophil # : x  Auto Neutrophil % : x  Auto Lymphocyte % : x  Auto Monocyte % : x  Auto Eosinophil % : x  Auto Basophil % : x      12-26    141  |  110<H>  |  14  ----------------------------<  107<H>  4.0   |  28  |  0.75    Ca    9.0      26 Dec 2023 06:59  Phos  3.5     12-26  Mg     2.9     12-26    TPro  7.6  /  Alb  3.9  /  TBili  0.5  /  DBili  x   /  AST  24  /  ALT  21  /  AlkPhos  61  12-25      LIVER FUNCTIONS - ( 25 Dec 2023 09:11 )  Alb: 3.9 g/dL / Pro: 7.6 gm/dL / ALK PHOS: 61 U/L / ALT: 21 U/L / AST: 24 U/L / GGT: x             PT/INR - ( 25 Dec 2023 09:11 )   PT: 11.3 sec;   INR: 1.00 ratio         PTT - ( 25 Dec 2023 09:11 )  PTT:35.0 sec          Urinalysis Basic - ( 26 Dec 2023 06:59 )    Color: x / Appearance: x / SG: x / pH: x  Gluc: 107 mg/dL / Ketone: x  / Bili: x / Urobili: x   Blood: x / Protein: x / Nitrite: x   Leuk Esterase: x / RBC: x / WBC x   Sq Epi: x / Non Sq Epi: x / Bacteria: x      < from: MR Cervical Spine No Cont (12.26.23 @ 10:14) >    MRI of the cervical spine was performed sagittal T1-T2 and STIR sequence.   Axial T2 andgradient echo sequence was performed as well.    Loss of the normal cervical lordosis seen.    Hemangioma is seen involving the T2 vertebral body.    Disc desiccation is seen throughout the cervical spine region. This most   prominent at the C5-6 level is secondary to chronic degenerative    C2-3: Normal    C3-4: Disc bulge and bilateral hypertrophic facet change. Mild narrowing   of the left neural foramen and moderate narrowing of the right neural   foramen    C4-5: Disc bulge and central disc protrusion is seen. Mild narrowing of   the spinal canal.    C5-6: Disc bulge and bilateral hypertrophic facet joint changes seen.   Moderate narrowing of the left neural foramen.    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    The spinal cord demonstrates normal signal and caliber.    Evaluation of the paraspinal soft tissues appear normal.    IMPRESSION: Degenerative changes as described above.    < end of copied text >    < from: MR Head No Cont (12.26.23 @ 10:14) >  IMPRESSION:    No hydrocephalus, mass effect, acute intracranial hemorrhage, vasogenic   edema, restricted diffusion, or acute territorial infarct.    < end of copied text >  < from: CT Angio Neck Stroke Protocol w/ IV Cont (12.25.23 @ 09:32) >  IMPRESSION: Unremarkable CTA of the head and neck. No large vessel   occlusion. Normal CT perfusion.    < end of copied text >  < from: CT Brain Stroke Protocol (12.25.23 @ 09:18) >  IMPRESSION: Unremarkable noncontrast CT of the brain.    < end of copied text >      MEDICATIONS  (STANDING):    atorvastatin 20 milliGRAM(s) Oral at bedtime  cyclobenzaprine 5 milliGRAM(s) Oral three times a day  dexAMETHasone  Injectable 4 milliGRAM(s) IV Push every 6 hours  losartan 50 milliGRAM(s) Oral daily  pantoprazole    Tablet 40 milliGRAM(s) Oral before breakfast    MEDICATIONS  (PRN):  acetaminophen     Tablet .. 650 milliGRAM(s) Oral every 6 hours PRN Temp greater or equal to 38C (100.4F), Mild Pain (1 - 3)  ondansetron Injectable 4 milliGRAM(s) IV Push every 8 hours PRN Nausea and/or Vomiting

## 2023-12-26 NOTE — PROGRESS NOTE ADULT - SUBJECTIVE AND OBJECTIVE BOX
HPI:  59 yo woman PMH of HTN and HLD who presents with acute L sided weakness. Feeling better.      MEDICATIONS  (STANDING):  aspirin enteric coated 81 milliGRAM(s) Oral daily  atorvastatin 80 milliGRAM(s) Oral at bedtime  clopidogrel Tablet 75 milliGRAM(s) Oral daily  losartan 50 milliGRAM(s) Oral daily  pantoprazole    Tablet 40 milliGRAM(s) Oral before breakfast    MEDICATIONS  (PRN):  acetaminophen     Tablet .. 650 milliGRAM(s) Oral every 6 hours PRN Temp greater or equal to 38C (100.4F), Mild Pain (1 - 3)  ondansetron Injectable 4 milliGRAM(s) IV Push every 8 hours PRN Nausea and/or Vomiting      Vital Signs Last 24 Hrs  T(C): 36.8 (26 Dec 2023 08:40), Max: 36.9 (25 Dec 2023 14:36)  T(F): 98.3 (26 Dec 2023 08:40), Max: 98.4 (25 Dec 2023 14:36)  HR: 65 (26 Dec 2023 10:47) (65 - 80)  BP: 134/79 (26 Dec 2023 10:47) (105/53 - 154/94)  BP(mean): 96 (26 Dec 2023 10:47) (96 - 114)  RR: 19 (26 Dec 2023 08:40) (15 - 20)  SpO2: 95% (26 Dec 2023 08:40) (95% - 100%)    Parameters below as of 26 Dec 2023 08:40  Patient On (Oxygen Delivery Method): room air      Neurological Exam:  HF: Patient is alert and oriented x 3. There is no aphasia or dysarthria. Follows  commands.   CN: Pupils are equal and reactive. Extra ocular muscles are intact. There is no facial droop or asymmetry. Tongue is midline. Sensation is intact in the face. Other CN II-XII are intact.   Motor: motor examination all muscles are -5/5.   Sensory: intact to  touch.  DTR: 2/4 all 4 extremities. Babinski is negative bilateral.  Co-ord:  Finger to finger to nose is intact.    Gait/balance: Patient ambulates without difficulty.       Basic Metabolic Panel in AM (12.26.23 @ 06:59)   Sodium: 141 mmol/L  Potassium: 4.0 mmol/L  Chloride: 110 mmol/L  Carbon Dioxide: 28 mmol/L  Anion Gap: 3 mmol/L  Blood Urea Nitrogen: 14 mg/dL  Creatinine: 0.75 mg/dL  Glucose: 107 mg/dL  Calcium: 9.0 mg/dL  eGFR: 91  Complete Blood Count in AM (12.26.23 @ 06:59)   WBC Count: 5.66 K/uL  RBC Count: 4.41 M/uL  Hemoglobin: 12.7 g/dL  Hematocrit: 39.4 %  Mean Cell Volume: 89.3 fl  Mean Cell Hemoglobin: 28.8 pg  Mean Cell Hemoglobin Conc: 32.2 gm/dL  Red Cell Distrib Width: 12.9 %  Platelet Count - Automated: 221 K/uL    < from: MR Head No Cont (12.26.23 @ 10:14) >  IMPRESSION:    No hydrocephalus, mass effect, acute intracranial hemorrhage, vasogenic   edema, restricted diffusion, or acute territorial infarct.    < end of copied text >    < from: MR Cervical Spine No Cont (12.26.23 @ 10:14) >  IMPRESSION: Degenerative changes as described above.    < end of copied text >    < from: CT Angio Neck Stroke Protocol w/ IV Cont (12.25.23 @ 09:32) >    IMPRESSION: Unremarkable CTA of the head and neck. No large vessel   occlusion. Normal CT perfusion.    < end of copied text >   HPI:  57 yo woman PMH of HTN and HLD who presents with acute L sided weakness. Feeling better.      MEDICATIONS  (STANDING):  aspirin enteric coated 81 milliGRAM(s) Oral daily  atorvastatin 80 milliGRAM(s) Oral at bedtime  clopidogrel Tablet 75 milliGRAM(s) Oral daily  losartan 50 milliGRAM(s) Oral daily  pantoprazole    Tablet 40 milliGRAM(s) Oral before breakfast    MEDICATIONS  (PRN):  acetaminophen     Tablet .. 650 milliGRAM(s) Oral every 6 hours PRN Temp greater or equal to 38C (100.4F), Mild Pain (1 - 3)  ondansetron Injectable 4 milliGRAM(s) IV Push every 8 hours PRN Nausea and/or Vomiting      Vital Signs Last 24 Hrs  T(C): 36.8 (26 Dec 2023 08:40), Max: 36.9 (25 Dec 2023 14:36)  T(F): 98.3 (26 Dec 2023 08:40), Max: 98.4 (25 Dec 2023 14:36)  HR: 65 (26 Dec 2023 10:47) (65 - 80)  BP: 134/79 (26 Dec 2023 10:47) (105/53 - 154/94)  BP(mean): 96 (26 Dec 2023 10:47) (96 - 114)  RR: 19 (26 Dec 2023 08:40) (15 - 20)  SpO2: 95% (26 Dec 2023 08:40) (95% - 100%)    Parameters below as of 26 Dec 2023 08:40  Patient On (Oxygen Delivery Method): room air      Neurological Exam:  HF: Patient is alert and oriented x 3. There is no aphasia or dysarthria. Follows  commands.   CN: Pupils are equal and reactive. Extra ocular muscles are intact. There is no facial droop or asymmetry. Tongue is midline. Sensation is intact in the face. Other CN II-XII are intact.   Motor: motor examination all muscles are -5/5.   Sensory: intact to  touch.  DTR: 2/4 all 4 extremities. Babinski is negative bilateral.  Co-ord:  Finger to finger to nose is intact.    Gait/balance: Patient ambulates without difficulty.       Basic Metabolic Panel in AM (12.26.23 @ 06:59)   Sodium: 141 mmol/L  Potassium: 4.0 mmol/L  Chloride: 110 mmol/L  Carbon Dioxide: 28 mmol/L  Anion Gap: 3 mmol/L  Blood Urea Nitrogen: 14 mg/dL  Creatinine: 0.75 mg/dL  Glucose: 107 mg/dL  Calcium: 9.0 mg/dL  eGFR: 91  Complete Blood Count in AM (12.26.23 @ 06:59)   WBC Count: 5.66 K/uL  RBC Count: 4.41 M/uL  Hemoglobin: 12.7 g/dL  Hematocrit: 39.4 %  Mean Cell Volume: 89.3 fl  Mean Cell Hemoglobin: 28.8 pg  Mean Cell Hemoglobin Conc: 32.2 gm/dL  Red Cell Distrib Width: 12.9 %  Platelet Count - Automated: 221 K/uL    < from: MR Head No Cont (12.26.23 @ 10:14) >  IMPRESSION:    No hydrocephalus, mass effect, acute intracranial hemorrhage, vasogenic   edema, restricted diffusion, or acute territorial infarct.    < end of copied text >    < from: MR Cervical Spine No Cont (12.26.23 @ 10:14) >  IMPRESSION: Degenerative changes as described above.    < end of copied text >    < from: CT Angio Neck Stroke Protocol w/ IV Cont (12.25.23 @ 09:32) >    IMPRESSION: Unremarkable CTA of the head and neck. No large vessel   occlusion. Normal CT perfusion.    < end of copied text >

## 2023-12-26 NOTE — CONSULT NOTE ADULT - ASSESSMENT
59 yo Croatian-speaking F with a PMH of HTN and HLD who presents with acute L sided weakness. Was given Plavix load,  in ER, admitted to telemetry for stroke workup.  Neurosurgery was consulted for cervical stenosis on MRI.    Will review imaging with attending accordingly   57 yo Italian-speaking F with a PMH of HTN and HLD who presents with acute L sided weakness. Was given Plavix load,  in ER, admitted to telemetry for stroke workup.  Neurosurgery was consulted for cervical stenosis on MRI.    Will review imaging with attending accordingly

## 2023-12-26 NOTE — CONSULT NOTE ADULT - NS ATTEND AMEND GEN_ALL_CORE FT
We will ask neurology to also evaluate this patient and I have requested additional opinions from Dr. Marsh who will also request the input of the team for this patient's symptoms.

## 2023-12-26 NOTE — PROGRESS NOTE ADULT - ASSESSMENT
58 year old woman hx of HTN, c/o headache, left sided numbness, weakness; MR head, CT head, Ct angios negative for acute findings.  Brent complicated migraine.  Suggest:  D/C Plavix 75 mg qd  f/u B/p. Aim systolic <140  Can add topiramate 25 mg po BID  Can f/u with my office after d/c

## 2023-12-26 NOTE — PHYSICAL THERAPY INITIAL EVALUATION ADULT - GENERAL OBSERVATIONS, REHAB EVAL
Maltese Surinamese Pt. received semi-supine in bed + tele, Uzbek speaking,  at  and assist with translation. Bed mob with SBA, amb with RW  ft. Pt. received semi-supine in bed + tele, Ukrainian speaking,  at  and assist with translation. Bed mob with SBA, amb with RW  ft.

## 2023-12-26 NOTE — PHYSICAL THERAPY INITIAL EVALUATION ADULT - ADDITIONAL COMMENTS
Lives with her . Recently moved to the  a few months ago, does not work. Lives with her . Recently moved [place] the [place] a few months ago, does not work. Pt. has 3 steps to enter.

## 2023-12-27 PROCEDURE — 99232 SBSQ HOSP IP/OBS MODERATE 35: CPT

## 2023-12-27 PROCEDURE — 99233 SBSQ HOSP IP/OBS HIGH 50: CPT

## 2023-12-27 RX ADMIN — Medication 4 MILLIGRAM(S): at 05:15

## 2023-12-27 RX ADMIN — ATORVASTATIN CALCIUM 20 MILLIGRAM(S): 80 TABLET, FILM COATED ORAL at 22:05

## 2023-12-27 RX ADMIN — Medication 650 MILLIGRAM(S): at 22:05

## 2023-12-27 RX ADMIN — PANTOPRAZOLE SODIUM 40 MILLIGRAM(S): 20 TABLET, DELAYED RELEASE ORAL at 05:15

## 2023-12-27 RX ADMIN — CYCLOBENZAPRINE HYDROCHLORIDE 5 MILLIGRAM(S): 10 TABLET, FILM COATED ORAL at 22:05

## 2023-12-27 RX ADMIN — ENOXAPARIN SODIUM 40 MILLIGRAM(S): 100 INJECTION SUBCUTANEOUS at 15:35

## 2023-12-27 RX ADMIN — CYCLOBENZAPRINE HYDROCHLORIDE 5 MILLIGRAM(S): 10 TABLET, FILM COATED ORAL at 05:15

## 2023-12-27 RX ADMIN — Medication 4 MILLIGRAM(S): at 15:34

## 2023-12-27 RX ADMIN — Medication 4 MILLIGRAM(S): at 22:05

## 2023-12-27 RX ADMIN — CYCLOBENZAPRINE HYDROCHLORIDE 5 MILLIGRAM(S): 10 TABLET, FILM COATED ORAL at 15:34

## 2023-12-27 RX ADMIN — LOSARTAN POTASSIUM 50 MILLIGRAM(S): 100 TABLET, FILM COATED ORAL at 09:50

## 2023-12-27 RX ADMIN — Medication 4 MILLIGRAM(S): at 09:51

## 2023-12-27 NOTE — OCCUPATIONAL THERAPY INITIAL EVALUATION ADULT - NSACTIVITYREC_GEN_A_OT
Pt presents with impaired balance, endurance and muscle strength that will benefit from skilled OT to improve independence in ADLs, reduce fall risk and chance for readmission. Pt educated on fine motor coordination activities, handout provided in Danish. Recommend patient f/up with a neuro ophthalmologist to further assess L eye visual deficits. Pt presents with impaired balance, endurance and muscle strength that will benefit from skilled OT to improve independence in ADLs, reduce fall risk and chance for readmission. Pt educated on fine motor coordination activities, handout provided in [place] Island[place]. Recommend patient f/up with a neuro ophthalmologist to further assess L eye visual deficits.

## 2023-12-27 NOTE — OCCUPATIONAL THERAPY INITIAL EVALUATION ADULT - MD ORDER
"OT Evaluate and Treat"- MD orders received. Chart reviewed, contents noted, conferred with RN "OT Evaluate and Treat"- MD orders received. Chart reviewed, contents noted, conferred with MARKIE Mercedes, pt Khmer speaking  used ID [id number] "OT Evaluate and Treat"- MD orders received. Chart reviewed, contents noted, conferred with MARIKE Mercedes, pt Maltese speaking  used ID [id number]

## 2023-12-27 NOTE — OCCUPATIONAL THERAPY INITIAL EVALUATION ADULT - MODALITIES TREATMENT COMMENTS
Pt left sitting in bedside chair, chair alarmed, RN notified, lines intact,  at bedside, CBIR, needs met.

## 2023-12-27 NOTE — OCCUPATIONAL THERAPY INITIAL EVALUATION ADULT - BALANCE TRAINING, PT EVAL
Pt will demonstrate improved static/dynamic by 1/2 grade standing balance in order to increase safety and independence in ADLs within 1 week.

## 2023-12-27 NOTE — PROGRESS NOTE ADULT - ASSESSMENT
A/P: 59 yo Nepali-speaking F with a PMH of HTN and HLD who presents with acute L sided weakness. Was given Plavix load,  in ER, admitted to telemetry for stroke workup.  Neurosurgery was consulted for cervical stenosis on MRI. A/P: 59 yo Romanian-speaking F with a PMH of HTN and HLD who presents with acute L sided weakness. Was given Plavix load,  in ER, admitted to telemetry for stroke workup.  Neurosurgery was consulted for cervical stenosis on MRI. A/P: 57 yo Estonian-speaking F with a PMH of HTN and HLD who presents with acute L sided weakness. Was given Plavix load,  in ER, admitted to telemetry for stroke workup.  Neurosurgery was consulted for cervical stenosis on MRI.    - Appreciate Neurology care   - Will further discuss case with Neurology to determine stroke vs. cervical stenosis     D/w Attending Dr. Marsh  A/P: 57 yo Chinese-speaking F with a PMH of HTN and HLD who presents with acute L sided weakness. Was given Plavix load,  in ER, admitted to telemetry for stroke workup.  Neurosurgery was consulted for cervical stenosis on MRI.    - Appreciate Neurology care   - Will further discuss case with Neurology to determine stroke vs. cervical stenosis     D/w Attending Dr. Marsh

## 2023-12-27 NOTE — OCCUPATIONAL THERAPY INITIAL EVALUATION ADULT - VISUAL ASSESSMENT: TRACKING
pt smooth pursuits impaired (pt reports her eye feels heavy), saccades unable to assess 2* language barrier/difficulty with translation during assessment, visual field- decreased awareness to L peripheral field (reports she feels like she sees black in the left peripheral, recommend f/up with a neuro ophthalmologist) pt smooth pursuits impaired (pt reports her L eye feels heavy), saccades unable to assess 2* language barrier/difficulty with translation during assessment, visual field- decreased awareness to L peripheral field (reports she feels like she sees black in the left peripheral, recommend f/up with a neuro ophthalmologist)

## 2023-12-27 NOTE — OCCUPATIONAL THERAPY INITIAL EVALUATION ADULT - PERTINENT HX OF CURRENT PROBLEM, REHAB EVAL
Per EMR, pt is a 59 y/o Syriac-speaking female with a PMHxof HTN and HLD who presents with acute L sided weakness. She was sleeping at night when at about 2AM, she felt a sharp mid-sternal CP, 8/10 in severity, radiating to the back, with associated SOB, and also noticed L arm weakness. She felt her L side was numb as well. She also felt at the time L sided headaches. She also noticed blurry L sided vision. The symptoms all worsened until she got to the ED and received aspirin. She states she has been getting similar symptoms for months "when my blood pressure goes high." She has been taking losartan but ran out of her medication 2 days ago. She is unsure if she had some ringing in her ears earlier. She notes she was told she had a "big heart," but never had anything done for it. CTH negative.    MRI head: No hydrocephalus, mass effect, acute intracranial hemorrhage, vasogenic edema, restricted diffusion, or acute territorial infarct.  MRI Cervical Spine: Degenerative changes as described above. Per EMR, pt is a 59 y/o Occitan-speaking female with a PMHxof HTN and HLD who presents with acute L sided weakness. She was sleeping at night when at about 2AM, she felt a sharp mid-sternal CP, 8/10 in severity, radiating to the back, with associated SOB, and also noticed L arm weakness. She felt her L side was numb as well. She also felt at the time L sided headaches. She also noticed blurry L sided vision. The symptoms all worsened until she got to the ED and received aspirin. She states she has been getting similar symptoms for months "when my blood pressure goes high." She has been taking losartan but ran out of her medication 2 days ago. She is unsure if she had some ringing in her ears earlier. She notes she was told she had a "big heart," but never had anything done for it. CTH negative.    MRI head: No hydrocephalus, mass effect, acute intracranial hemorrhage, vasogenic edema, restricted diffusion, or acute territorial infarct.  MRI Cervical Spine: Degenerative changes as described above.

## 2023-12-27 NOTE — PROGRESS NOTE ADULT - SUBJECTIVE AND OBJECTIVE BOX
57 yo Surinamese-speaking F with a PMH of HTN and HLD who presents with acute L sided weakness. She was sleeping at night when at about 2AM, she felt a sharp mid-sternal CP, 8/10 in severity, radiating to the back, with associated SOB, and also noticed L arm weakness. She felt her L side was numb as well. She also felt at the time L sided headaches. She also noticed blurry L sided vision. The symptoms all worsened until she got to the ED and received aspirin. She states she has been getting similar symptoms for months "when my blood pressure goes high." She has been taking losartan but ran out of her medication 2 days ago. She denies N/V, diarrhea, constipation, or dizziness. She is unsure if she had some ringing in her ears earlier. She notes she was told she had a "big heart," but never had anything done for it.  In the ED, she was given aspirin 324 mg PO x1. Neurosurgery was consulted for cervical stenosis on MR.     < from: MR Cervical Spine No Cont (12.26.23 @ 10:14) >  MRI of the cervical spine was performed sagittal T1-T2 and STIR sequence.   Axial T2 andgradient echo sequence was performed as well.  Loss of the normal cervical lordosis seen.  Hemangioma is seen involving the T2 vertebral body.  Disc desiccation is seen throughout the cervical spine region. This most   prominent at the C5-6 level is secondary to chronic degenerative  < from: MR Cervical Spine No Cont (12.26.23 @ 10:14) >  C2-3: Normal  C3-4: Disc bulge and bilateral hypertrophic facet change. Mild narrowing   of the left neural foramen and moderate narrowing of the right neural   foramen  C4-5: Disc bulge and central disc protrusion is seen. Mild narrowing of   the spinal canal.  C5-6: Disc bulge and bilateral hypertrophic facet joint changes seen.   Moderate narrowing of the left neural foramen.  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  T1-2: Left-sidednerve root sleeve cyst is seen.  The spinal cord demonstrates normal signal and caliber.  Evaluation of the paraspinal soft tissues appear normal.    Vital Signs Last 24 Hrs  T(C): 36.8 (12-27-23 @ 08:26), Max: 37.2 (12-26-23 @ 23:33)  T(F): 98.2 (12-27-23 @ 08:26), Max: 98.9 (12-26-23 @ 23:33)  HR: 77 (12-27-23 @ 08:26) (60 - 77)  BP: 117/67 (12-27-23 @ 08:26) (98/43 - 134/79)  BP(mean): 96 (12-26-23 @ 10:47) (96 - 96)  RR: 20 (12-27-23 @ 08:26) (20 - 20)  SpO2: 96% (12-27-23 @ 08:26) (93% - 97%)    MEDICATIONS  (STANDING):  atorvastatin 20 milliGRAM(s) Oral at bedtime  cyclobenzaprine 5 milliGRAM(s) Oral three times a day  dexAMETHasone  Injectable 4 milliGRAM(s) IV Push every 6 hours  enoxaparin Injectable 40 milliGRAM(s) SubCutaneous every 24 hours  losartan 50 milliGRAM(s) Oral daily  pantoprazole    Tablet 40 milliGRAM(s) Oral before breakfast    CBC:            12.7   5.66  )-----------( 221      ( 12-26-23 @ 06:59 )             39.4         Chem:         ( 12-26-23 @ 06:59 )    141  |  110<H>  |  14  ----------------------------<  107<H>  4.0   |  28  |  0.75      MEDICATIONS  (PRN):  acetaminophen     Tablet .. 650 milliGRAM(s) Oral every 6 hours PRN Temp greater or equal to 38C (100.4F), Mild Pain (1 - 3)  ondansetron Injectable 4 milliGRAM(s) IV Push every 8 hours PRN Nausea and/or Vomiting    Physical Exam:   Alert and oriented   Face symmetric   EOMI  RUE Bi 5 Tri 5 HI 5 HG 5   LUE Bi 4/5 Tri 4/5 HI 4/5 HG 4/5   RLE Q 5 IP 5 DF 5 PF 5    LLE Q 3/5 IP 3/5 DF 3/5 PF 3/5   57 yo [place] Rican-speaking F with a PMH of HTN and HLD who presents with acute L sided weakness. She was sleeping at night when at about 2AM, she felt a sharp mid-sternal CP, 8/10 in severity, radiating to the back, with associated SOB, and also noticed L arm weakness. She felt her L side was numb as well. She also felt at the time L sided headaches. She also noticed blurry L sided vision. The symptoms all worsened until she got to the ED and received aspirin. She states she has been getting similar symptoms for months "when my blood pressure goes high." She has been taking losartan but ran out of her medication 2 days ago. She denies N/V, diarrhea, constipation, or dizziness. She is unsure if she had some ringing in her ears earlier. She notes she was told she had a "big heart," but never had anything done for it.  In the ED, she was given aspirin 324 mg PO x1. Neurosurgery was consulted for cervical stenosis on MR.     < from: MR Cervical Spine No Cont (12.26.23 @ 10:14) >  MRI of the cervical spine was performed sagittal T1-T2 and STIR sequence.   Axial T2 andgradient echo sequence was performed as well.  Loss of the normal cervical lordosis seen.  Hemangioma is seen involving the T2 vertebral body.  Disc desiccation is seen throughout the cervical spine region. This most   prominent at the C5-6 level is secondary to chronic degenerative  < from: MR Cervical Spine No Cont (12.26.23 @ 10:14) >  C2-3: Normal  C3-4: Disc bulge and bilateral hypertrophic facet change. Mild narrowing   of the left neural foramen and moderate narrowing of the right neural   foramen  C4-5: Disc bulge and central disc protrusion is seen. Mild narrowing of   the spinal canal.  C5-6: Disc bulge and bilateral hypertrophic facet joint changes seen.   Moderate narrowing of the left neural foramen.  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  T1-2: Left-sidednerve root sleeve cyst is seen.  The spinal cord demonstrates normal signal and caliber.  Evaluation of the paraspinal soft tissues appear normal.    Vital Signs Last 24 Hrs  T(C): 36.8 (12-27-23 @ 08:26), Max: 37.2 (12-26-23 @ 23:33)  T(F): 98.2 (12-27-23 @ 08:26), Max: 98.9 (12-26-23 @ 23:33)  HR: 77 (12-27-23 @ 08:26) (60 - 77)  BP: 117/67 (12-27-23 @ 08:26) (98/43 - 134/79)  BP(mean): 96 (12-26-23 @ 10:47) (96 - 96)  RR: 20 (12-27-23 @ 08:26) (20 - 20)  SpO2: 96% (12-27-23 @ 08:26) (93% - 97%)    MEDICATIONS  (STANDING):  atorvastatin 20 milliGRAM(s) Oral at bedtime  cyclobenzaprine 5 milliGRAM(s) Oral three times a day  dexAMETHasone  Injectable 4 milliGRAM(s) IV Push every 6 hours  enoxaparin Injectable 40 milliGRAM(s) SubCutaneous every 24 hours  losartan 50 milliGRAM(s) Oral daily  pantoprazole    Tablet 40 milliGRAM(s) Oral before breakfast    CBC:            12.7   5.66  )-----------( 221      ( 12-26-23 @ 06:59 )             39.4         Chem:         ( 12-26-23 @ 06:59 )    141  |  110<H>  |  14  ----------------------------<  107<H>  4.0   |  28  |  0.75      MEDICATIONS  (PRN):  acetaminophen     Tablet .. 650 milliGRAM(s) Oral every 6 hours PRN Temp greater or equal to 38C (100.4F), Mild Pain (1 - 3)  ondansetron Injectable 4 milliGRAM(s) IV Push every 8 hours PRN Nausea and/or Vomiting    Physical Exam:   Alert and oriented   Face symmetric   EOMI  RUE Bi 5 Tri 5 HI 5 HG 5   LUE Bi 4/5 Tri 4/5 HI 4/5 HG 4/5   RLE Q 5 IP 5 DF 5 PF 5    LLE Q 3/5 IP 3/5 DF 3/5 PF 3/5

## 2023-12-27 NOTE — PROGRESS NOTE ADULT - ASSESSMENT
59 yo Wolof-speaking F with a PMH of HTN and HLD who presents with acute LUE/LLE weakness, chest pain, and headache in the setting of uncontrolled HTN, with persistent neurological deficits, r/o CVA.    # Left arm and Leg weakness: No CVA  likely sec to multiple level DDD and disc bulges + T1-2: Left-sided nerve root sleeve cyst vs complex migraine  flexeril + decadron  neuro sc consult  admit   - EKG non-pathological, no STEMI  - stop Aspirin and Plavix   -statin home dose  - MRI brain without contrast  - BP control  - Check TTE  - Trend troponins x3  - Appreciate neurology recs  neuro sx consult    #Uncontrolled Hypertension  - Patient had ran out of meds x 2 days, BP was normal upon ED arrival but elevated multiple times after and on my exam  - Restart losartan 50 mg QD for now  - If not controlled, consider calcium channel blocker addition  - Cardiac workup including TTE    #Atypical Chest Pain  - Patient's CP is radiating to the back  - Troponins normal x2, repeat at least one more  - Suspect in the setting of HTN, less likely ischemic but suspect patient may have cardiomyopathy  - BPs equal on both sides and sxs improved after aspirin, not c/w dissection  - Check TTE  - Vital signs Q4H  - BP control    #Prophylactic Measure  - DVT PPX: lovenox  - Diet: DASH  - Dispo: pending evaluation above   59 yo English-speaking F with a PMH of HTN and HLD who presents with acute LUE/LLE weakness, chest pain, and headache in the setting of uncontrolled HTN, with persistent neurological deficits, r/o CVA.    # Left arm and Leg weakness: No CVA  likely sec to multiple level DDD and disc bulges + T1-2: Left-sided nerve root sleeve cyst vs complex migraine  flexeril + decadron  neuro sc consult  admit   - EKG non-pathological, no STEMI  - stop Aspirin and Plavix   -statin home dose  - MRI brain without contrast  - BP control  - Check TTE  - Trend troponins x3  - Appreciate neurology recs  neuro sx consult    #Uncontrolled Hypertension  - Patient had ran out of meds x 2 days, BP was normal upon ED arrival but elevated multiple times after and on my exam  - Restart losartan 50 mg QD for now  - If not controlled, consider calcium channel blocker addition  - Cardiac workup including TTE    #Atypical Chest Pain  - Patient's CP is radiating to the back  - Troponins normal x2, repeat at least one more  - Suspect in the setting of HTN, less likely ischemic but suspect patient may have cardiomyopathy  - BPs equal on both sides and sxs improved after aspirin, not c/w dissection  - Check TTE  - Vital signs Q4H  - BP control    #Prophylactic Measure  - DVT PPX: lovenox  - Diet: DASH  - Dispo: pending evaluation above

## 2023-12-27 NOTE — PROGRESS NOTE ADULT - ASSESSMENT
58 year old woman hx of HTN, c/o headache, left sided numbness, weakness. Improved, still c/o weakness on left. ; MR head, CT head, Ct angios negative for acute findings. MR srinivasan spine, mx level DDD. Likely complicated migraine. Prolonged duration of stated weakness, mr srinivasan-spine doesn't explain her sxs.  Suggest:  D/C Plavix 75 mg qd  f/u B/p. Aim systolic <140  Can add topiramate 25 mg po BID  PT as OPT  Can f/u with my office after d/c

## 2023-12-27 NOTE — OCCUPATIONAL THERAPY INITIAL EVALUATION ADULT - ADDITIONAL COMMENTS
Pt reports she resides in a  with her  with 3 CHRIS none inside. Pt has a walk in shower with no DME in bathroom, standard toilet. Pt reports being (I) with ADL/IADL tasks PTA, walked without AD. RHD, reading glasses. Pt reports her husbands works M-F 8-5.

## 2023-12-27 NOTE — PROGRESS NOTE ADULT - SUBJECTIVE AND OBJECTIVE BOX
12/26: c/o left sided neck pain with weakness of left arm and left leg  MRI brain neg for cva  MRI c spine was also ordered; multiple level DDD and disc bulges + T1-2: Left-sided nerve root sleeve cyst  Neuro Sx consult  start flexeril + iv decadron  PTx consult    12/27: weakness persists    PHYSICAL EXAM:      Vital Signs Last 24 Hrs  T(C): 36.7 (27 Dec 2023 15:53), Max: 37.2 (26 Dec 2023 23:33)  T(F): 98 (27 Dec 2023 15:53), Max: 98.9 (26 Dec 2023 23:33)  HR: 84 (27 Dec 2023 15:53) (60 - 84)  BP: 102/58 (27 Dec 2023 15:53) (98/43 - 117/67)  BP(mean): --  RR: 19 (27 Dec 2023 15:53) (19 - 20)  SpO2: 95% (27 Dec 2023 15:53) (93% - 96%)    Parameters below as of 27 Dec 2023 15:53  Patient On (Oxygen Delivery Method): room air        Constitutional: Weak appearing  HEENT: Atraumatic, JINNY,   Respiratory: Breath Sounds normal, no rhonchi/wheeze  Cardiovascular: N S1S2;   Gastrointestinal: Abdomen soft, non tender, Bowel Sounds present  Extremities: No edema, peripheral pulses present  Neurological: AAO x 3, left arm 4/5; left leg 4/5  Skin: Non cellulitic, no rash, ulcers  Lymph Nodes: No lymphadenopathy noted  Back: No CVA tenderness   Musculoskeletal: non tender  Breasts: Deferred  Genitourinary: deferred  Rectal: Deferred    All Labs/EKG/Radiology/Meds reviewed by me                          12.7   5.66  )-----------( 221      ( 26 Dec 2023 06:59 )             39.4       CBC Full  -  ( 26 Dec 2023 06:59 )  WBC Count : 5.66 K/uL  RBC Count : 4.41 M/uL  Hemoglobin : 12.7 g/dL  Hematocrit : 39.4 %  Platelet Count - Automated : 221 K/uL  Mean Cell Volume : 89.3 fl  Mean Cell Hemoglobin : 28.8 pg  Mean Cell Hemoglobin Concentration : 32.2 gm/dL  Auto Neutrophil # : x  Auto Lymphocyte # : x  Auto Monocyte # : x  Auto Eosinophil # : x  Auto Basophil # : x  Auto Neutrophil % : x  Auto Lymphocyte % : x  Auto Monocyte % : x  Auto Eosinophil % : x  Auto Basophil % : x      12-26    141  |  110<H>  |  14  ----------------------------<  107<H>  4.0   |  28  |  0.75    Ca    9.0      26 Dec 2023 06:59  Phos  3.5     12-26  Mg     2.9     12-26    TPro  7.6  /  Alb  3.9  /  TBili  0.5  /  DBili  x   /  AST  24  /  ALT  21  /  AlkPhos  61  12-25      LIVER FUNCTIONS - ( 25 Dec 2023 09:11 )  Alb: 3.9 g/dL / Pro: 7.6 gm/dL / ALK PHOS: 61 U/L / ALT: 21 U/L / AST: 24 U/L / GGT: x             PT/INR - ( 25 Dec 2023 09:11 )   PT: 11.3 sec;   INR: 1.00 ratio         PTT - ( 25 Dec 2023 09:11 )  PTT:35.0 sec          Urinalysis Basic - ( 26 Dec 2023 06:59 )    Color: x / Appearance: x / SG: x / pH: x  Gluc: 107 mg/dL / Ketone: x  / Bili: x / Urobili: x   Blood: x / Protein: x / Nitrite: x   Leuk Esterase: x / RBC: x / WBC x   Sq Epi: x / Non Sq Epi: x / Bacteria: x      < from: MR Cervical Spine No Cont (12.26.23 @ 10:14) >    MRI of the cervical spine was performed sagittal T1-T2 and STIR sequence.   Axial T2 andgradient echo sequence was performed as well.    Loss of the normal cervical lordosis seen.    Hemangioma is seen involving the T2 vertebral body.    Disc desiccation is seen throughout the cervical spine region. This most   prominent at the C5-6 level is secondary to chronic degenerative    C2-3: Normal    C3-4: Disc bulge and bilateral hypertrophic facet change. Mild narrowing   of the left neural foramen and moderate narrowing of the right neural   foramen    C4-5: Disc bulge and central disc protrusion is seen. Mild narrowing of   the spinal canal.    C5-6: Disc bulge and bilateral hypertrophic facet joint changes seen.   Moderate narrowing of the left neural foramen.    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    The spinal cord demonstrates normal signal and caliber.    Evaluation of the paraspinal soft tissues appear normal.    IMPRESSION: Degenerative changes as described above.    < end of copied text >    < from: MR Head No Cont (12.26.23 @ 10:14) >  IMPRESSION:    No hydrocephalus, mass effect, acute intracranial hemorrhage, vasogenic   edema, restricted diffusion, or acute territorial infarct.    < end of copied text >  < from: CT Angio Neck Stroke Protocol w/ IV Cont (12.25.23 @ 09:32) >  IMPRESSION: Unremarkable CTA of the head and neck. No large vessel   occlusion. Normal CT perfusion.    < end of copied text >  < from: CT Brain Stroke Protocol (12.25.23 @ 09:18) >  IMPRESSION: Unremarkable noncontrast CT of the brain.    < end of copied text >      MEDICATIONS  (STANDING):    atorvastatin 20 milliGRAM(s) Oral at bedtime  cyclobenzaprine 5 milliGRAM(s) Oral three times a day  dexAMETHasone  Injectable 4 milliGRAM(s) IV Push every 6 hours  losartan 50 milliGRAM(s) Oral daily  pantoprazole    Tablet 40 milliGRAM(s) Oral before breakfast    MEDICATIONS  (PRN):  acetaminophen     Tablet .. 650 milliGRAM(s) Oral every 6 hours PRN Temp greater or equal to 38C (100.4F), Mild Pain (1 - 3)  ondansetron Injectable 4 milliGRAM(s) IV Push every 8 hours PRN Nausea and/or Vomiting

## 2023-12-27 NOTE — PROGRESS NOTE ADULT - SUBJECTIVE AND OBJECTIVE BOX
HPI:  57 yo woman PMH of HTN and HLD who presents with headache and acute L sided weakness. Improved, headache resolved, still left side feels weak, no neck pain.      MEDICATIONS  (STANDING):  atorvastatin 20 milliGRAM(s) Oral at bedtime  cyclobenzaprine 5 milliGRAM(s) Oral three times a day  dexAMETHasone  Injectable 4 milliGRAM(s) IV Push every 6 hours  enoxaparin Injectable 40 milliGRAM(s) SubCutaneous every 24 hours  losartan 50 milliGRAM(s) Oral daily  pantoprazole    Tablet 40 milliGRAM(s) Oral before breakfast    MEDICATIONS  (PRN):  acetaminophen     Tablet .. 650 milliGRAM(s) Oral every 6 hours PRN Temp greater or equal to 38C (100.4F), Mild Pain (1 - 3)  ondansetron Injectable 4 milliGRAM(s) IV Push every 8 hours PRN Nausea and/or Vomiting      Vital Signs Last 24 Hrs  T(C): 36.8 (27 Dec 2023 08:26), Max: 37.2 (26 Dec 2023 23:33)  T(F): 98.2 (27 Dec 2023 08:26), Max: 98.9 (26 Dec 2023 23:33)  HR: 77 (27 Dec 2023 08:26) (60 - 77)  BP: 117/67 (27 Dec 2023 08:26) (98/43 - 128/81)  BP(mean): --  RR: 20 (27 Dec 2023 08:26) (20 - 20)  SpO2: 96% (27 Dec 2023 08:26) (93% - 97%)    Parameters below as of 27 Dec 2023 08:26  Patient On (Oxygen Delivery Method): room air      Neurological Exam:    HF: Patient is alert and oriented x 3. There is no aphasia or dysarthria. Follows complex commands.   CN: Vision is intact to confrontation. Pupils are equal and reactive. Extra ocular muscles are intact. There is no facial droop or asymmetry. Tongue is midline. Sensation is intact in the face. Other CN II-XII are intact.   Motor: motor examination + LUE pronator drift, strenght LUE ~ 4/5, LLE ~ 4/5, nl tone. RURE/RLE 5./5  Sensory: intact to  touch.   DTR: 1-2/4 all 4 extremities. Babinski is negative bilateral.  Co-ord:  Finger to finger to nose is intact.   Gait/balance: hesitant gait.       < from: MR Head No Cont (12.26.23 @ 10:14) >  FINDINGS:    No hydrocephalus, midline shift, mass effect, vasogenic edema, or acute   intracranial hemorrhage.    No restricted diffusion or evidence for acute territorial infarct.  Mild white matter microvascular ischemic disease.    Flow voids are seen within the major intracranial vessels consistent with   their patency.    Polyp/retention cyst inthe posterior left and ethmoid sinus, remaining   visualized paranasal sinuses and mastoid air cells are clear.    Orbits, sellar and suprasellar structures, and craniocervical junction   are unremarkable.    IMPRESSION:    No hydrocephalus, mass effect, acute intracranial hemorrhage, vasogenic   edema, restricted diffusion, or acute territorial infarct.    --- End of Report ---    < end of copied text >      < from: MR Cervical Spine No Cont (12.26.23 @ 10:14) >      INTERPRETATION:  Clinical indication: Neck pain.    MRI of the cervical spine was performed sagittal T1-T2 and STIR sequence.   Axial T2 andgradient echo sequence was performed as well.    Loss of the normal cervical lordosis seen.    Hemangioma is seen involving the T2 vertebral body.    Disc desiccation is seen throughout the cervical spine region. This most   prominent at the C5-6 level is secondary to chronic degenerative    C2-3: Normal    C3-4: Disc bulge and bilateral hypertrophic facet change. Mild narrowing   of the left neural foramen and moderate narrowing of the right neural   foramen    C4-5: Disc bulge and central disc protrusion is seen. Mild narrowing of   the spinal canal.    C5-6: Disc bulge and bilateral hypertrophic facet joint changes seen.   Moderate narrowing of the left neural foramen.    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    T1-2: Left-sidednerve root sleeve cyst is seen.    The spinal cord demonstrates normal signal and caliber.    Evaluation of the paraspinal soft tissues appear normal.    IMPRESSION: Degenerative changes as described above.    < end of copied text >

## 2023-12-28 ENCOUNTER — TRANSCRIPTION ENCOUNTER (OUTPATIENT)
Age: 58
End: 2023-12-28

## 2023-12-28 PROCEDURE — 72148 MRI LUMBAR SPINE W/O DYE: CPT | Mod: 26

## 2023-12-28 PROCEDURE — 99232 SBSQ HOSP IP/OBS MODERATE 35: CPT

## 2023-12-28 PROCEDURE — 72146 MRI CHEST SPINE W/O DYE: CPT | Mod: 26

## 2023-12-28 PROCEDURE — 99233 SBSQ HOSP IP/OBS HIGH 50: CPT

## 2023-12-28 RX ORDER — DEXAMETHASONE 0.5 MG/5ML
1 ELIXIR ORAL
Qty: 10 | Refills: 0
Start: 2023-12-28 | End: 2024-01-01

## 2023-12-28 RX ORDER — CYCLOBENZAPRINE HYDROCHLORIDE 10 MG/1
1 TABLET, FILM COATED ORAL
Qty: 15 | Refills: 0
Start: 2023-12-28 | End: 2024-01-01

## 2023-12-28 RX ORDER — LOSARTAN POTASSIUM 100 MG/1
1 TABLET, FILM COATED ORAL
Qty: 30 | Refills: 0 | DISCHARGE
Start: 2023-12-28 | End: 2024-01-26

## 2023-12-28 RX ORDER — LOSARTAN POTASSIUM 100 MG/1
1 TABLET, FILM COATED ORAL
Refills: 0 | DISCHARGE

## 2023-12-28 RX ORDER — LOSARTAN POTASSIUM 100 MG/1
1 TABLET, FILM COATED ORAL
Qty: 30 | Refills: 0
Start: 2023-12-28 | End: 2024-01-26

## 2023-12-28 RX ADMIN — ATORVASTATIN CALCIUM 20 MILLIGRAM(S): 80 TABLET, FILM COATED ORAL at 21:30

## 2023-12-28 RX ADMIN — ENOXAPARIN SODIUM 40 MILLIGRAM(S): 100 INJECTION SUBCUTANEOUS at 18:11

## 2023-12-28 RX ADMIN — PANTOPRAZOLE SODIUM 40 MILLIGRAM(S): 20 TABLET, DELAYED RELEASE ORAL at 05:37

## 2023-12-28 RX ADMIN — LOSARTAN POTASSIUM 50 MILLIGRAM(S): 100 TABLET, FILM COATED ORAL at 09:42

## 2023-12-28 RX ADMIN — CYCLOBENZAPRINE HYDROCHLORIDE 5 MILLIGRAM(S): 10 TABLET, FILM COATED ORAL at 21:30

## 2023-12-28 RX ADMIN — CYCLOBENZAPRINE HYDROCHLORIDE 5 MILLIGRAM(S): 10 TABLET, FILM COATED ORAL at 05:35

## 2023-12-28 RX ADMIN — Medication 4 MILLIGRAM(S): at 04:34

## 2023-12-28 RX ADMIN — Medication 650 MILLIGRAM(S): at 09:42

## 2023-12-28 RX ADMIN — Medication 4 MILLIGRAM(S): at 18:13

## 2023-12-28 RX ADMIN — Medication 4 MILLIGRAM(S): at 09:42

## 2023-12-28 NOTE — DISCHARGE NOTE PROVIDER - PROVIDER TOKENS
PROVIDER:[TOKEN:[[id number]]] PROVIDER:[TOKEN:[[id number]]],PROVIDER:[TOKEN:[[id number]]],PROVIDER:[TOKEN:[[id number]],FOLLOWUP:[1 week]] PROVIDER:[TOKEN:[[id number]]],PROVIDER:[TOKEN:[[id number]]],PROVIDER:[TOKEN:[[id number]],FOLLOWUP:[1 week]]

## 2023-12-28 NOTE — PROGRESS NOTE ADULT - ASSESSMENT
57 yo Lao-speaking F with a PMH of HTN and HLD who presents with acute LUE/LLE weakness, chest pain, and headache in the setting of uncontrolled HTN, with persistent neurological deficits, r/o CVA.    # Left arm and Leg weakness: No CVA ; likely from cervical spine stenosis  likely sec to multiple level DDD and disc bulges + T1-2: Left-sided nerve root sleeve cyst vs complex migraine  flexeril + decadron  neuro sc consult  admit   - EKG non-pathological, no STEMI  - stop Aspirin and Plavix   -statin home dose  - MRI brain without contrast neg  - BP control  - Check TTE neg  - Trend troponins x3 neg  - Appreciate neurology recs  neuro sx consult  MRI T and L spine awaited    #Uncontrolled Hypertension  - Patient had ran out of meds x 2 days, BP was normal upon ED arrival but elevated multiple times after and on my exam  - Restart losartan 50 mg QD for now  - If not controlled, consider calcium channel blocker addition  - Cardiac workup including TTE    #Atypical Chest Pain  - Patient's CP is radiating to the back  - Troponins normal x2, repeat at least one more  - Suspect in the setting of HTN, less likely ischemic but suspect patient may have cardiomyopathy  - BPs equal on both sides and sxs improved after aspirin, not c/w dissection  - Check TTE  - Vital signs Q4H  - BP control    # Elevated TSH: 5.20: f/u with your PCP for full work up to r/o hypothyroidism and if present then its treatment    # Pre diabetes: Hb A1c 5.9:  diet and exercise  f/u with PCP    #Prophylactic Measure  - DVT PPX: lovenox  - Diet: DASH    - Dispo: MRI T and L spine   57 yo Romanian-speaking F with a PMH of HTN and HLD who presents with acute LUE/LLE weakness, chest pain, and headache in the setting of uncontrolled HTN, with persistent neurological deficits, r/o CVA.    # Left arm and Leg weakness: No CVA ; likely from cervical spine stenosis  likely sec to multiple level DDD and disc bulges + T1-2: Left-sided nerve root sleeve cyst vs complex migraine  flexeril + decadron  neuro sc consult  admit   - EKG non-pathological, no STEMI  - stop Aspirin and Plavix   -statin home dose  - MRI brain without contrast neg  - BP control  - Check TTE neg  - Trend troponins x3 neg  - Appreciate neurology recs  neuro sx consult  MRI T and L spine awaited    #Uncontrolled Hypertension  - Patient had ran out of meds x 2 days, BP was normal upon ED arrival but elevated multiple times after and on my exam  - Restart losartan 50 mg QD for now  - If not controlled, consider calcium channel blocker addition  - Cardiac workup including TTE    #Atypical Chest Pain  - Patient's CP is radiating to the back  - Troponins normal x2, repeat at least one more  - Suspect in the setting of HTN, less likely ischemic but suspect patient may have cardiomyopathy  - BPs equal on both sides and sxs improved after aspirin, not c/w dissection  - Check TTE  - Vital signs Q4H  - BP control    # Elevated TSH: 5.20: f/u with your PCP for full work up to r/o hypothyroidism and if present then its treatment    # Pre diabetes: Hb A1c 5.9:  diet and exercise  f/u with PCP    #Prophylactic Measure  - DVT PPX: lovenox  - Diet: DASH    - Dispo: MRI T and L spine

## 2023-12-28 NOTE — DISCHARGE NOTE PROVIDER - NPI NUMBER (FOR [employer] USE ONLY) :
[[id number]] [[id number] [[id number]],[[id number]] [[id number]],[[id number]],[[id number]]

## 2023-12-28 NOTE — DISCHARGE NOTE PROVIDER - NSDCFUSCHEDAPPT_GEN_ALL_CORE_FT
Madison Casey  [facility]  CARDIOLOGY [address]  Scheduled Appointment: 01/02/2024     Madison Casey  [facility]  CARDIOLOGY [address]  Scheduled Appointment: 01/02/2024

## 2023-12-28 NOTE — DISCHARGE NOTE PROVIDER - HOSPITAL COURSE
PHYSICAL EXAM:    Daily     Daily     Vital Signs Last 24 Hrs  T(C): 36.7 (28 Dec 2023 08:59), Max: 36.8 (27 Dec 2023 21:01)  T(F): 98 (28 Dec 2023 08:59), Max: 98.3 (27 Dec 2023 21:01)  HR: 67 (28 Dec 2023 08:59) (67 - 84)  BP: 138/74 (28 Dec 2023 08:59) (102/58 - 138/74)  BP(mean): --  RR: 18 (28 Dec 2023 08:59) (18 - 19)  SpO2: 95% (28 Dec 2023 08:59) (95% - 97%)    Constitutional: Weak  appearing  HEENT: Atraumatic, JINNY, Normal, No congestion  Respiratory: Breath Sounds normal, no rhonchi/wheeze  Cardiovascular: N S1S2;   Gastrointestinal: Abdomen soft, non tender, Bowel Sounds present  Extremities: No edema, peripheral pulses present  Neurological: AAO x 3, left arm and left leg 4.5/5  Skin: Non cellulitic, no rash, ulcers  Lymph Nodes: No lymphadenopathy noted  Back: No CVA tenderness   Musculoskeletal: non tender  Breasts: Deferred  Genitourinary: deferred  Rectal: Deferred    57 yo Portuguese-speaking F with a PMH of HTN and HLD who presents with acute LUE/LLE weakness, chest pain, and headache in the setting of uncontrolled HTN, with persistent neurological deficits, r/o CVA.    # Left arm and Leg weakness: No CVA; due to cervical spinal stesnosis  likely sec to multiple level DDD and disc bulges + T1-2: Left-sided nerve root sleeve cyst vs complex migraine  continue flexeril + decadron x 5 days more  neuro sx consult appreciated; out pt f/u as pt symptoms improving  - EKG non-pathological, no STEMI  - stop Aspirin and Plavix   -statin home dose  - MRI brain without contrast  - BP control  - Check TTE, normal  - Trend troponins x3 neg  - Appreciate neurology recs    #Uncontrolled Hypertension  - Patient had ran out of meds x 2 days, BP was normal upon ED arrival but elevated multiple times after and on my exam  - Restart losartan 50 mg QD for now  - Cardiac workup including TTE  f/u with PCP for further BP meds management    #Atypical Chest Pain  - Patient's CP is radiating to the back  - Troponins normal x3,   - BPs equal on both sides and sxs improved after aspirin, not c/w dissection  - Check TTE, normal  - BP better controlled    # Elevated TSH: 5.20: f/u with your PCP for full work up to r/o hypothyroidism and if present then its treatment    # Pre diabetes: Hb A1c 5.9:  diet and exercise  f/u with PCP    d/c home    time spent 45 min           PHYSICAL EXAM:    Daily     Daily     Vital Signs Last 24 Hrs  T(C): 36.7 (28 Dec 2023 08:59), Max: 36.8 (27 Dec 2023 21:01)  T(F): 98 (28 Dec 2023 08:59), Max: 98.3 (27 Dec 2023 21:01)  HR: 67 (28 Dec 2023 08:59) (67 - 84)  BP: 138/74 (28 Dec 2023 08:59) (102/58 - 138/74)  BP(mean): --  RR: 18 (28 Dec 2023 08:59) (18 - 19)  SpO2: 95% (28 Dec 2023 08:59) (95% - 97%)    Constitutional: Weak  appearing  HEENT: Atraumatic, JINNY, Normal, No congestion  Respiratory: Breath Sounds normal, no rhonchi/wheeze  Cardiovascular: N S1S2;   Gastrointestinal: Abdomen soft, non tender, Bowel Sounds present  Extremities: No edema, peripheral pulses present  Neurological: AAO x 3, left arm and left leg 4.5/5  Skin: Non cellulitic, no rash, ulcers  Lymph Nodes: No lymphadenopathy noted  Back: No CVA tenderness   Musculoskeletal: non tender  Breasts: Deferred  Genitourinary: deferred  Rectal: Deferred    59 yo Danish-speaking F with a PMH of HTN and HLD who presents with acute LUE/LLE weakness, chest pain, and headache in the setting of uncontrolled HTN, with persistent neurological deficits, r/o CVA.    # Left arm and Leg weakness: No CVA; due to cervical spinal stesnosis  likely sec to multiple level DDD and disc bulges + T1-2: Left-sided nerve root sleeve cyst vs complex migraine  continue flexeril + decadron x 5 days more  neuro sx consult appreciated; out pt f/u as pt symptoms improving  - EKG non-pathological, no STEMI  - stop Aspirin and Plavix   -statin home dose  - MRI brain without contrast  - BP control  - Check TTE, normal  - Trend troponins x3 neg  - Appreciate neurology recs    #Uncontrolled Hypertension  - Patient had ran out of meds x 2 days, BP was normal upon ED arrival but elevated multiple times after and on my exam  - Restart losartan 50 mg QD for now  - Cardiac workup including TTE  f/u with PCP for further BP meds management    #Atypical Chest Pain  - Patient's CP is radiating to the back  - Troponins normal x3,   - BPs equal on both sides and sxs improved after aspirin, not c/w dissection  - Check TTE, normal  - BP better controlled    # Elevated TSH: 5.20: f/u with your PCP for full work up to r/o hypothyroidism and if present then its treatment    # Pre diabetes: Hb A1c 5.9:  diet and exercise  f/u with PCP    d/c home    time spent 45 min         59 yo Georgian-speaking F with a PMH of HTN and HLD who presents with acute L sided weakness. She was sleeping at night when at about 2AM, she felt a sharp mid-sternal CP, 8/10 in severity, radiating to the back, with associated SOB, and also noticed L arm weakness. She felt her L side was numb as well. She also felt at the time L sided headaches. She also noticed blurry L sided vision. The symptoms all worsened until she got to the ED and received aspirin. She states she has been getting similar symptoms for months "when my blood pressure goes high." She has been taking losartan but ran out of her medication 2 days ago. She denies N/V, diarrhea, constipation, or dizziness. She is unsure if she had some ringing in her ears earlier. She notes she was told she had a "big heart," but never had anything done for it.    MRI of the cervical spine was performed sagittal T1-T2 and STIR sequence.   Axial T2 and gradient echo sequence was performed as well.  Loss of the normal cervical lordosis seen.  Hemangioma is seen involving the T2 vertebral body.  Disc desiccation is seen throughout the cervical spine region. This most   prominent at the C5-6 level is secondary to chronic degenerative    C2-3: Normal  C3-4: Disc bulge and bilateral hypertrophic facet change. Mild narrowing   of the left neural foramen and moderate narrowing of the right neural   foramen  C4-5: Disc bulge and central disc protrusion is seen. Mild narrowing of   the spinal canal.  C5-6: Disc bulge and bilateral hypertrophic facet joint changes seen.   Moderate narrowing of the left neural foramen.  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  The spinal cord demonstrates normal signal and caliber.  Evaluation of the paraspinal soft tissues appear normal.  PHYSICAL EXAM:    Daily     Daily     Vital Signs Last 24 Hrs  T(C): 36.7 (28 Dec 2023 08:59), Max: 36.8 (27 Dec 2023 21:01)  T(F): 98 (28 Dec 2023 08:59), Max: 98.3 (27 Dec 2023 21:01)  HR: 67 (28 Dec 2023 08:59) (67 - 84)  BP: 138/74 (28 Dec 2023 08:59) (102/58 - 138/74)  BP(mean): --  RR: 18 (28 Dec 2023 08:59) (18 - 19)  SpO2: 95% (28 Dec 2023 08:59) (95% - 97%)    Constitutional: Weak  appearing  HEENT: Atraumatic, JINNY, Normal, No congestion  Respiratory: Breath Sounds normal, no rhonchi/wheeze  Cardiovascular: N S1S2;   Gastrointestinal: Abdomen soft, non tender, Bowel Sounds present  Extremities: No edema, peripheral pulses present  Neurological: AAO x 3, left arm and left leg 4.5/5  Skin: Non cellulitic, no rash, ulcers  Lymph Nodes: No lymphadenopathy noted  Back: No CVA tenderness   Musculoskeletal: non tender  Breasts: Deferred  Genitourinary: deferred  Rectal: Deferred    59 yo Georgian-speaking F with a PMH of HTN and HLD who presents with acute LUE/LLE weakness, chest pain, and headache in the setting of uncontrolled HTN, with persistent neurological deficits, r/o CVA.    # Left arm and Leg weakness: No CVA; due to cervical spinal stenosis  likely sec to multiple level DDD and disc bulges + T1-2: Left-sided nerve root sleeve cyst vs complex migraine  continue flexeril + decadron x 5 days more  neuro sx consult appreciated; out pt f/u as pt symptoms improving  - EKG non-pathological, no STEMI  - stop Aspirin and Plavix   -statin home dose  - MRI brain without contrast  - BP control  - Check TTE, normal  - Trend troponin x3 neg  - Appreciate neurology recs    #Uncontrolled Hypertension  - Patient had ran out of meds x 2 days, BP was normal upon ED arrival but elevated multiple times after and on my exam  - Restart losartan 50 mg QD for now  - Cardiac workup including TTE  f/u with PCP for further BP meds management    #Atypical Chest Pain  - Patient's CP is radiating to the back  - Troponin normal x3,   - BPs equal on both sides and sxs improved after aspirin, not c/w dissection  - Check TTE, normal  - BP better controlled    # Elevated TSH: 5.20: f/u with your PCP for full work up to r/o hypothyroidism and if present then its treatment    # Pre diabetes: Hb A1c 5.9:  diet and exercise  f/u with PCP    d/c home         59 yo Panamanian-speaking F with a PMH of HTN and HLD who presents with acute L sided weakness. She was sleeping at night when at about 2AM, she felt a sharp mid-sternal CP, 8/10 in severity, radiating to the back, with associated SOB, and also noticed L arm weakness. She felt her L side was numb as well. She also felt at the time L sided headaches. She also noticed blurry L sided vision. The symptoms all worsened until she got to the ED and received aspirin. She states she has been getting similar symptoms for months "when my blood pressure goes high." She has been taking losartan but ran out of her medication 2 days ago. She denies N/V, diarrhea, constipation, or dizziness. She is unsure if she had some ringing in her ears earlier. She notes she was told she had a "big heart," but never had anything done for it.    MRI of the cervical spine was performed sagittal T1-T2 and STIR sequence.   Axial T2 and gradient echo sequence was performed as well.  Loss of the normal cervical lordosis seen.  Hemangioma is seen involving the T2 vertebral body.  Disc desiccation is seen throughout the cervical spine region. This most   prominent at the C5-6 level is secondary to chronic degenerative    C2-3: Normal  C3-4: Disc bulge and bilateral hypertrophic facet change. Mild narrowing   of the left neural foramen and moderate narrowing of the right neural   foramen  C4-5: Disc bulge and central disc protrusion is seen. Mild narrowing of   the spinal canal.  C5-6: Disc bulge and bilateral hypertrophic facet joint changes seen.   Moderate narrowing of the left neural foramen.  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  The spinal cord demonstrates normal signal and caliber.  Evaluation of the paraspinal soft tissues appear normal.  PHYSICAL EXAM:    Daily     Daily     Vital Signs Last 24 Hrs  T(C): 36.7 (28 Dec 2023 08:59), Max: 36.8 (27 Dec 2023 21:01)  T(F): 98 (28 Dec 2023 08:59), Max: 98.3 (27 Dec 2023 21:01)  HR: 67 (28 Dec 2023 08:59) (67 - 84)  BP: 138/74 (28 Dec 2023 08:59) (102/58 - 138/74)  BP(mean): --  RR: 18 (28 Dec 2023 08:59) (18 - 19)  SpO2: 95% (28 Dec 2023 08:59) (95% - 97%)    Constitutional: Weak  appearing  HEENT: Atraumatic, JINNY, Normal, No congestion  Respiratory: Breath Sounds normal, no rhonchi/wheeze  Cardiovascular: N S1S2;   Gastrointestinal: Abdomen soft, non tender, Bowel Sounds present  Extremities: No edema, peripheral pulses present  Neurological: AAO x 3, left arm and left leg 4.5/5  Skin: Non cellulitic, no rash, ulcers  Lymph Nodes: No lymphadenopathy noted  Back: No CVA tenderness   Musculoskeletal: non tender  Breasts: Deferred  Genitourinary: deferred  Rectal: Deferred    59 yo Panamanian-speaking F with a PMH of HTN and HLD who presents with acute LUE/LLE weakness, chest pain, and headache in the setting of uncontrolled HTN, with persistent neurological deficits, r/o CVA.    # Left arm and Leg weakness: No CVA; due to cervical spinal stenosis  likely sec to multiple level DDD and disc bulges + T1-2: Left-sided nerve root sleeve cyst vs complex migraine  continue flexeril + decadron x 5 days more  neuro sx consult appreciated; out pt f/u as pt symptoms improving  - EKG non-pathological, no STEMI  - stop Aspirin and Plavix   -statin home dose  - MRI brain without contrast  - BP control  - Check TTE, normal  - Trend troponin x3 neg  - Appreciate neurology recs    #Uncontrolled Hypertension  - Patient had ran out of meds x 2 days, BP was normal upon ED arrival but elevated multiple times after and on my exam  - Restart losartan 50 mg QD for now  - Cardiac workup including TTE  f/u with PCP for further BP meds management    #Atypical Chest Pain  - Patient's CP is radiating to the back  - Troponin normal x3,   - BPs equal on both sides and sxs improved after aspirin, not c/w dissection  - Check TTE, normal  - BP better controlled    # Elevated TSH: 5.20: f/u with your PCP for full work up to r/o hypothyroidism and if present then its treatment    # Pre diabetes: Hb A1c 5.9:  diet and exercise  f/u with PCP    d/c home         59 yo Iranian-speaking F with a PMH of HTN and HLD who presents with acute L sided weakness. She was sleeping at night when at about 2AM, she felt a sharp mid-sternal CP, 8/10 in severity, radiating to the back, with associated SOB, and also noticed L arm weakness. She felt her L side was numb as well. She also felt at the time L sided headaches. She also noticed blurry L sided vision. The symptoms all worsened until she got to the ED and received aspirin. She states she has been getting similar symptoms for months "when my blood pressure goes high." She has been taking losartan but ran out of her medication 2 days ago. She denies N/V, diarrhea, constipation, or dizziness. She is unsure if she had some ringing in her ears earlier. She notes she was told she had a "big heart," but never had anything done for it.    MRI of the cervical spine was performed sagittal T1-T2 and STIR sequence.   Axial T2 and gradient echo sequence was performed as well.  Loss of the normal cervical lordosis seen.  Hemangioma is seen involving the T2 vertebral body.  Disc desiccation is seen throughout the cervical spine region. This most   prominent at the C5-6 level is secondary to chronic degenerative    C2-3: Normal  C3-4: Disc bulge and bilateral hypertrophic facet change. Mild narrowing   of the left neural foramen and moderate narrowing of the right neural   foramen  C4-5: Disc bulge and central disc protrusion is seen. Mild narrowing of   the spinal canal.  C5-6: Disc bulge and bilateral hypertrophic facet joint changes seen.   Moderate narrowing of the left neural foramen.  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  The spinal cord demonstrates normal signal and caliber.  Evaluation of the paraspinal soft tissues appear normal.  PHYSICAL EXAM:    Vital Signs Last 24 Hrs  T(C): 36.8 (29 Dec 2023 08:11), Max: 37.4 (28 Dec 2023 23:50)  T(F): 98.2 (29 Dec 2023 08:11), Max: 99.3 (28 Dec 2023 23:50)  HR: 60 (29 Dec 2023 08:11) (51 - 68)  BP: 127/74 (29 Dec 2023 08:11) (121/59 - 133/71)  BP(mean): --  RR: 18 (29 Dec 2023 08:11) (18 - 18)  SpO2: 96% (29 Dec 2023 08:11) (96% - 97%)    Parameters below as of 29 Dec 2023 08:11  Patient On (Oxygen Delivery Method): room air    Constitutional: Weak  appearing  HEENT: Atraumatic, JINNY, Normal, No congestion  Respiratory: Breath Sounds normal, no rhonchi/wheeze  Cardiovascular: N S1S2;   Gastrointestinal: Abdomen soft, non tender, Bowel Sounds present  Extremities: No edema, peripheral pulses present  Neurological: AAO x 3, left arm and left leg 4.5/5  Skin: Non cellulitic, no rash, ulcers  Lymph Nodes: No lymphadenopathy noted  Back: No CVA tenderness   Musculoskeletal: non tender  Breasts: Deferred  Genitourinary: deferred  Rectal: Deferred    59 yo Iranian-speaking F with a PMH of HTN and HLD who presents with acute LUE/LLE weakness, chest pain, and headache in the setting of uncontrolled HTN, with persistent neurological deficits, r/o CVA.    # Left arm and Leg weakness: No CVA; due to cervical spinal stenosis  likely sec to multiple level DDD and disc bulges + T1-2: Left-sided nerve root sleeve cyst vs complex migraine  continue flexeril + decadron x 5 days more  neuro sx consult appreciated; out pt f/u as pt symptoms improving  - EKG non-pathological, no STEMI  - stop Aspirin and Plavix   -statin home dose  - MRI brain without contrast  - BP control  - Check TTE, normal  - Trend troponin x3 neg  - Appreciate neurology recs    #Uncontrolled Hypertension  - Patient had ran out of meds x 2 days, BP was normal upon ED arrival but elevated multiple times after and on my exam  - Restart losartan 50 mg QD for now  - Cardiac workup including TTE  f/u with PCP for further BP meds management    #Atypical Chest Pain  - Patient's CP is radiating to the back  - Troponin normal x3,   - BPs equal on both sides and sxs improved after aspirin, not c/w dissection  - Check TTE, normal  - BP better controlled    # Elevated TSH: 5.20: f/u with your PCP for full work up to r/o hypothyroidism and if present then its treatment    # Pre diabetes: Hb A1c 5.9:  diet and exercise  f/u with PCP    d/c home    time spent 45 min         59 yo Nigerien-speaking F with a PMH of HTN and HLD who presents with acute L sided weakness. She was sleeping at night when at about 2AM, she felt a sharp mid-sternal CP, 8/10 in severity, radiating to the back, with associated SOB, and also noticed L arm weakness. She felt her L side was numb as well. She also felt at the time L sided headaches. She also noticed blurry L sided vision. The symptoms all worsened until she got to the ED and received aspirin. She states she has been getting similar symptoms for months "when my blood pressure goes high." She has been taking losartan but ran out of her medication 2 days ago. She denies N/V, diarrhea, constipation, or dizziness. She is unsure if she had some ringing in her ears earlier. She notes she was told she had a "big heart," but never had anything done for it.    MRI of the cervical spine was performed sagittal T1-T2 and STIR sequence.   Axial T2 and gradient echo sequence was performed as well.  Loss of the normal cervical lordosis seen.  Hemangioma is seen involving the T2 vertebral body.  Disc desiccation is seen throughout the cervical spine region. This most   prominent at the C5-6 level is secondary to chronic degenerative    C2-3: Normal  C3-4: Disc bulge and bilateral hypertrophic facet change. Mild narrowing   of the left neural foramen and moderate narrowing of the right neural   foramen  C4-5: Disc bulge and central disc protrusion is seen. Mild narrowing of   the spinal canal.  C5-6: Disc bulge and bilateral hypertrophic facet joint changes seen.   Moderate narrowing of the left neural foramen.  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  The spinal cord demonstrates normal signal and caliber.  Evaluation of the paraspinal soft tissues appear normal.  PHYSICAL EXAM:    Vital Signs Last 24 Hrs  T(C): 36.8 (29 Dec 2023 08:11), Max: 37.4 (28 Dec 2023 23:50)  T(F): 98.2 (29 Dec 2023 08:11), Max: 99.3 (28 Dec 2023 23:50)  HR: 60 (29 Dec 2023 08:11) (51 - 68)  BP: 127/74 (29 Dec 2023 08:11) (121/59 - 133/71)  BP(mean): --  RR: 18 (29 Dec 2023 08:11) (18 - 18)  SpO2: 96% (29 Dec 2023 08:11) (96% - 97%)    Parameters below as of 29 Dec 2023 08:11  Patient On (Oxygen Delivery Method): room air    Constitutional: Weak  appearing  HEENT: Atraumatic, JINNY, Normal, No congestion  Respiratory: Breath Sounds normal, no rhonchi/wheeze  Cardiovascular: N S1S2;   Gastrointestinal: Abdomen soft, non tender, Bowel Sounds present  Extremities: No edema, peripheral pulses present  Neurological: AAO x 3, left arm and left leg 4.5/5  Skin: Non cellulitic, no rash, ulcers  Lymph Nodes: No lymphadenopathy noted  Back: No CVA tenderness   Musculoskeletal: non tender  Breasts: Deferred  Genitourinary: deferred  Rectal: Deferred    59 yo Nigerien-speaking F with a PMH of HTN and HLD who presents with acute LUE/LLE weakness, chest pain, and headache in the setting of uncontrolled HTN, with persistent neurological deficits, r/o CVA.    # Left arm and Leg weakness: No CVA; due to cervical spinal stenosis  likely sec to multiple level DDD and disc bulges + T1-2: Left-sided nerve root sleeve cyst vs complex migraine  continue flexeril + decadron x 5 days more  neuro sx consult appreciated; out pt f/u as pt symptoms improving  - EKG non-pathological, no STEMI  - stop Aspirin and Plavix   -statin home dose  - MRI brain without contrast  - BP control  - Check TTE, normal  - Trend troponin x3 neg  - Appreciate neurology recs    #Uncontrolled Hypertension  - Patient had ran out of meds x 2 days, BP was normal upon ED arrival but elevated multiple times after and on my exam  - Restart losartan 50 mg QD for now  - Cardiac workup including TTE  f/u with PCP for further BP meds management    #Atypical Chest Pain  - Patient's CP is radiating to the back  - Troponin normal x3,   - BPs equal on both sides and sxs improved after aspirin, not c/w dissection  - Check TTE, normal  - BP better controlled    # Elevated TSH: 5.20: f/u with your PCP for full work up to r/o hypothyroidism and if present then its treatment    # Pre diabetes: Hb A1c 5.9:  diet and exercise  f/u with PCP    d/c home    time spent 45 min

## 2023-12-28 NOTE — DISCHARGE NOTE NURSING/CASE MANAGEMENT/SOCIAL WORK - NSDCPEFALRISK_GEN_ALL_CORE
For information on Fall & Injury Prevention, visit: [email or web address] OR  [email or web address] OR  https://www.cdc.gov/steadi/patient.html For information on Fall & Injury Prevention, visit: [email or web address] OR  [email or web address] OR  https://www.cdc.gov/steadi/patient.html

## 2023-12-28 NOTE — DISCHARGE NOTE PROVIDER - CARE PROVIDER_API CALL
Geovanna Hatfield  [facility]  [address], [address]  [place] [address]  Phone: [phone]  Fax: [phone]  Follow Up Time:    Geovanna Hatfield  [facility]  [address], [address]  [place] [address]  Phone: [phone]  Fax: [phone]  Follow Up Time:    Geovanna Hatfield  [facility]  [address]  [place] [address]  Phone: [phone]  Fax: [phone]  Follow Up Time:     Chris Ibarra  Neurosurgery  [address] [place] [address]  Phone: [phone]  Fax: [phone]  Follow Up Time:     Valentin Marsh  Neurosurgery  [address]  [place] [address]  Phone: [phone]  Fax: [phone]  Follow Up Time: 1 week   Geovanna Hatfield  [facility]  [address]  [place] [address]  Phone: [phone]  Fax: [phone]  Follow Up Time:     Chris Ibarra  Neurosurgery  [address] [place] [address]  Phone: [phone]  Fax: [phone]  Follow Up Time:     Valentin Marsh  Neurosurgery  [address]  [place] [address]  Phone: [phone]  Fax: [phone]  Follow Up Time: 1 week

## 2023-12-28 NOTE — DISCHARGE NOTE PROVIDER - NSDCMRMEDTOKEN_GEN_ALL_CORE_FT
atorvastatin 20 mg oral tablet: 1 tab(s) orally once a day  bisacodyl 5 mg oral delayed release tablet: 1 tab(s) orally once a day  cyclobenzaprine 5 mg oral tablet: 1 tab(s) orally 3 times a day as needed for  muscle spasm MDD: 15 mg  dexAMETHasone 4 mg oral tablet: 1 tab(s) orally 2 times a day  losartan 50 mg oral tablet: 1 tab(s) orally once a day  omeprazole 40 mg oral delayed release capsule: orally once a day

## 2023-12-28 NOTE — PROGRESS NOTE ADULT - SUBJECTIVE AND OBJECTIVE BOX
Neurosurgery:    57 yo Indonesian-speaking F with a PMH of HTN and HLD who presents with acute L sided weakness. She was sleeping at night when at about 2AM, she felt a sharp mid-sternal CP, 8/10 in severity, radiating to the back, with associated SOB, and also noticed L arm weakness. She felt her L side was numb as well. She also felt at the time L sided headaches. She also noticed blurry L sided vision. The symptoms all worsened until she got to the ED and received aspirin. She states she has been getting similar symptoms for months "when my blood pressure goes high." She has been taking losartan but ran out of her medication 2 days ago. She denies N/V, diarrhea, constipation, or dizziness. She is unsure if she had some ringing in her ears earlier. She notes she was told she had a "big heart," but never had anything done for it.    MRI of the cervical spine was performed sagittal T1-T2 and STIR sequence.   Axial T2 andgradient echo sequence was performed as well.  Loss of the normal cervical lordosis seen.  Hemangioma is seen involving the T2 vertebral body.  Disc desiccation is seen throughout the cervical spine region. This most   prominent at the C5-6 level is secondary to chronic degenerative    C2-3: Normal  C3-4: Disc bulge and bilateral hypertrophic facet change. Mild narrowing   of the left neural foramen and moderate narrowing of the right neural   foramen  C4-5: Disc bulge and central disc protrusion is seen. Mild narrowing of   the spinal canal.  C5-6: Disc bulge and bilateral hypertrophic facet joint changes seen.   Moderate narrowing of the left neural foramen.  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  T1-2: Left-sidednerve root sleeve cyst is seen.  The spinal cord demonstrates normal signal and caliber.  Evaluation of the paraspinal soft tissues appear normal.    MEDICATIONS  (STANDING):  atorvastatin 20 milliGRAM(s) Oral at bedtime  cyclobenzaprine 5 milliGRAM(s) Oral three times a day  dexAMETHasone  Injectable 4 milliGRAM(s) IV Push every 6 hours  enoxaparin Injectable 40 milliGRAM(s) SubCutaneous every 24 hours  losartan 50 milliGRAM(s) Oral daily  pantoprazole    Tablet 40 milliGRAM(s) Oral before breakfast    Vital Signs Last 24 Hrs  T(C): 36.7 (28 Dec 2023 08:59), Max: 36.8 (27 Dec 2023 21:01)  T(F): 98 (28 Dec 2023 08:59), Max: 98.3 (27 Dec 2023 21:01)  HR: 67 (28 Dec 2023 08:59) (67 - 84)  BP: 138/74 (28 Dec 2023 08:59) (102/58 - 138/74)  BP(mean): --  RR: 18 (28 Dec 2023 08:59) (18 - 19)  SpO2: 95% (28 Dec 2023 08:59) (95% - 97%)    Physical Exam:  Constitutional: Awake / alert  HEENT: PERRLA, EOMI  Neck: Supple  Respiratory: Breath sounds are clear bilaterally  Cardiovascular: S1 and S2, regular rhythm  Gastrointestinal: Soft, NT/ND  Extremities:  no edema    Neurological Exam:  HF: A x O x 3, appropriately interactive, normal affect, speech fluent  CN: PERRL, EOMI, VFF, facial sensation normal, no NLFD, tongue midline  Motor: No pronator drift, Strength 5/5 in all 4 ext, normal bulk and tone, no tremor, rigidity or bradykinesia  Sens: Intact to light touch  Reflexes: Symmetric and normal, downgoing toes b/l  Coord:  No FNFA, dysmetria, LASHAWN intact   Gait/Balance: Cannot test    A/P: 57 yo Indonesian-speaking F with a PMH of HTN and HLD who presents with acute L sided weakness. Was given Plavix load,  in ER, admitted to telemetry for stroke workup.  Neurosurgery was consulted for cervical stenosis on MRI.    - Appreciate Neurology care   - MR of T / L spine for full neuro-axis review  - Improved on steroids  - Pt may follow up with Dr. Marsh upon discharge from hospital accordingly  - Pt is stable for discharge home after MR studies  - d/w Hospitalist attending accordingly    D/w Attending Dr. Marsh        Neurosurgery:    57 yo Russian-speaking F with a PMH of HTN and HLD who presents with acute L sided weakness. She was sleeping at night when at about 2AM, she felt a sharp mid-sternal CP, 8/10 in severity, radiating to the back, with associated SOB, and also noticed L arm weakness. She felt her L side was numb as well. She also felt at the time L sided headaches. She also noticed blurry L sided vision. The symptoms all worsened until she got to the ED and received aspirin. She states she has been getting similar symptoms for months "when my blood pressure goes high." She has been taking losartan but ran out of her medication 2 days ago. She denies N/V, diarrhea, constipation, or dizziness. She is unsure if she had some ringing in her ears earlier. She notes she was told she had a "big heart," but never had anything done for it.    MRI of the cervical spine was performed sagittal T1-T2 and STIR sequence.   Axial T2 andgradient echo sequence was performed as well.  Loss of the normal cervical lordosis seen.  Hemangioma is seen involving the T2 vertebral body.  Disc desiccation is seen throughout the cervical spine region. This most   prominent at the C5-6 level is secondary to chronic degenerative    C2-3: Normal  C3-4: Disc bulge and bilateral hypertrophic facet change. Mild narrowing   of the left neural foramen and moderate narrowing of the right neural   foramen  C4-5: Disc bulge and central disc protrusion is seen. Mild narrowing of   the spinal canal.  C5-6: Disc bulge and bilateral hypertrophic facet joint changes seen.   Moderate narrowing of the left neural foramen.  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  T1-2: Left-sidednerve root sleeve cyst is seen.  The spinal cord demonstrates normal signal and caliber.  Evaluation of the paraspinal soft tissues appear normal.    MEDICATIONS  (STANDING):  atorvastatin 20 milliGRAM(s) Oral at bedtime  cyclobenzaprine 5 milliGRAM(s) Oral three times a day  dexAMETHasone  Injectable 4 milliGRAM(s) IV Push every 6 hours  enoxaparin Injectable 40 milliGRAM(s) SubCutaneous every 24 hours  losartan 50 milliGRAM(s) Oral daily  pantoprazole    Tablet 40 milliGRAM(s) Oral before breakfast    Vital Signs Last 24 Hrs  T(C): 36.7 (28 Dec 2023 08:59), Max: 36.8 (27 Dec 2023 21:01)  T(F): 98 (28 Dec 2023 08:59), Max: 98.3 (27 Dec 2023 21:01)  HR: 67 (28 Dec 2023 08:59) (67 - 84)  BP: 138/74 (28 Dec 2023 08:59) (102/58 - 138/74)  BP(mean): --  RR: 18 (28 Dec 2023 08:59) (18 - 19)  SpO2: 95% (28 Dec 2023 08:59) (95% - 97%)    Physical Exam:  Constitutional: Awake / alert  HEENT: PERRLA, EOMI  Neck: Supple  Respiratory: Breath sounds are clear bilaterally  Cardiovascular: S1 and S2, regular rhythm  Gastrointestinal: Soft, NT/ND  Extremities:  no edema    Neurological Exam:  HF: A x O x 3, appropriately interactive, normal affect, speech fluent  CN: PERRL, EOMI, VFF, facial sensation normal, no NLFD, tongue midline  Motor: No pronator drift, Strength 5/5 in all 4 ext, normal bulk and tone, no tremor, rigidity or bradykinesia  Sens: Intact to light touch  Reflexes: Symmetric and normal, downgoing toes b/l  Coord:  No FNFA, dysmetria, LASHAWN intact   Gait/Balance: Cannot test    A/P: 57 yo Russian-speaking F with a PMH of HTN and HLD who presents with acute L sided weakness. Was given Plavix load,  in ER, admitted to telemetry for stroke workup.  Neurosurgery was consulted for cervical stenosis on MRI.    - Appreciate Neurology care   - MR of T / L spine for full neuro-axis review  - Improved on steroids  - Pt may follow up with Dr. Marsh upon discharge from hospital accordingly  - Pt is stable for discharge home after MR studies  - d/w Hospitalist attending accordingly    D/w Attending Dr. Marsh

## 2023-12-28 NOTE — PROGRESS NOTE ADULT - SUBJECTIVE AND OBJECTIVE BOX
12/26: c/o left sided neck pain with weakness of left arm and left leg  MRI brain neg for cva  MRI c spine was also ordered; multiple level DDD and disc bulges + T1-2: Left-sided nerve root sleeve cyst  Neuro Sx consult  start flexeril + iv decadron  PTx consult    12/27: weakness persists    12/28: weakness improved  MRI T spine and L spine as per neuro sx     PHYSICAL EXAM:      Vital Signs Last 24 Hrs  T(C): 36.7 (28 Dec 2023 08:59), Max: 36.8 (27 Dec 2023 21:01)  T(F): 98 (28 Dec 2023 08:59), Max: 98.3 (27 Dec 2023 21:01)  HR: 67 (28 Dec 2023 08:59) (67 - 84)  BP: 138/74 (28 Dec 2023 08:59) (102/58 - 138/74)  BP(mean): --  RR: 18 (28 Dec 2023 08:59) (18 - 19)  SpO2: 95% (28 Dec 2023 08:59) (95% - 97%)    Parameters below as of 28 Dec 2023 08:59  Patient On (Oxygen Delivery Method): room air          Constitutional: Weak appearing  HEENT: Atraumatic, JINNY,   Respiratory: Breath Sounds normal, no rhonchi/wheeze  Cardiovascular: N S1S2;   Gastrointestinal: Abdomen soft, non tender, Bowel Sounds present  Extremities: No edema, peripheral pulses present  Neurological: AAO x 3, left arm 4/5; left leg 4/5  Skin: Non cellulitic, no rash, ulcers  Lymph Nodes: No lymphadenopathy noted  Back: No CVA tenderness   Musculoskeletal: non tender  Breasts: Deferred  Genitourinary: deferred  Rectal: Deferred    All Labs/EKG/Radiology/Meds reviewed by me                          12.7   5.66  )-----------( 221      ( 26 Dec 2023 06:59 )             39.4       CBC Full  -  ( 26 Dec 2023 06:59 )  WBC Count : 5.66 K/uL  RBC Count : 4.41 M/uL  Hemoglobin : 12.7 g/dL  Hematocrit : 39.4 %  Platelet Count - Automated : 221 K/uL  Mean Cell Volume : 89.3 fl  Mean Cell Hemoglobin : 28.8 pg  Mean Cell Hemoglobin Concentration : 32.2 gm/dL  Auto Neutrophil # : x  Auto Lymphocyte # : x  Auto Monocyte # : x  Auto Eosinophil # : x  Auto Basophil # : x  Auto Neutrophil % : x  Auto Lymphocyte % : x  Auto Monocyte % : x  Auto Eosinophil % : x  Auto Basophil % : x      12-26    141  |  110<H>  |  14  ----------------------------<  107<H>  4.0   |  28  |  0.75    Ca    9.0      26 Dec 2023 06:59  Phos  3.5     12-26  Mg     2.9     12-26    TPro  7.6  /  Alb  3.9  /  TBili  0.5  /  DBili  x   /  AST  24  /  ALT  21  /  AlkPhos  61  12-25      LIVER FUNCTIONS - ( 25 Dec 2023 09:11 )  Alb: 3.9 g/dL / Pro: 7.6 gm/dL / ALK PHOS: 61 U/L / ALT: 21 U/L / AST: 24 U/L / GGT: x             PT/INR - ( 25 Dec 2023 09:11 )   PT: 11.3 sec;   INR: 1.00 ratio         PTT - ( 25 Dec 2023 09:11 )  PTT:35.0 sec          Urinalysis Basic - ( 26 Dec 2023 06:59 )    Color: x / Appearance: x / SG: x / pH: x  Gluc: 107 mg/dL / Ketone: x  / Bili: x / Urobili: x   Blood: x / Protein: x / Nitrite: x   Leuk Esterase: x / RBC: x / WBC x   Sq Epi: x / Non Sq Epi: x / Bacteria: x      < from: MR Cervical Spine No Cont (12.26.23 @ 10:14) >    MRI of the cervical spine was performed sagittal T1-T2 and STIR sequence.   Axial T2 andgradient echo sequence was performed as well.    Loss of the normal cervical lordosis seen.    Hemangioma is seen involving the T2 vertebral body.    Disc desiccation is seen throughout the cervical spine region. This most   prominent at the C5-6 level is secondary to chronic degenerative    C2-3: Normal    C3-4: Disc bulge and bilateral hypertrophic facet change. Mild narrowing   of the left neural foramen and moderate narrowing of the right neural   foramen    C4-5: Disc bulge and central disc protrusion is seen. Mild narrowing of   the spinal canal.    C5-6: Disc bulge and bilateral hypertrophic facet joint changes seen.   Moderate narrowing of the left neural foramen.    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    The spinal cord demonstrates normal signal and caliber.    Evaluation of the paraspinal soft tissues appear normal.    IMPRESSION: Degenerative changes as described above.    < end of copied text >    < from: MR Head No Cont (12.26.23 @ 10:14) >  IMPRESSION:    No hydrocephalus, mass effect, acute intracranial hemorrhage, vasogenic   edema, restricted diffusion, or acute territorial infarct.    < end of copied text >  < from: CT Angio Neck Stroke Protocol w/ IV Cont (12.25.23 @ 09:32) >  IMPRESSION: Unremarkable CTA of the head and neck. No large vessel   occlusion. Normal CT perfusion.    < end of copied text >  < from: CT Brain Stroke Protocol (12.25.23 @ 09:18) >  IMPRESSION: Unremarkable noncontrast CT of the brain.    < end of copied text >      MEDICATIONS  (STANDING):    atorvastatin 20 milliGRAM(s) Oral at bedtime  cyclobenzaprine 5 milliGRAM(s) Oral three times a day  dexAMETHasone  Injectable 4 milliGRAM(s) IV Push every 6 hours  losartan 50 milliGRAM(s) Oral daily  pantoprazole    Tablet 40 milliGRAM(s) Oral before breakfast    MEDICATIONS  (PRN):  acetaminophen     Tablet .. 650 milliGRAM(s) Oral every 6 hours PRN Temp greater or equal to 38C (100.4F), Mild Pain (1 - 3)  ondansetron Injectable 4 milliGRAM(s) IV Push every 8 hours PRN Nausea and/or Vomiting

## 2023-12-28 NOTE — DISCHARGE NOTE PROVIDER - CARE PROVIDERS DIRECT ADDRESSES
,[email or web address]

## 2023-12-28 NOTE — PROGRESS NOTE ADULT - SUBJECTIVE AND OBJECTIVE BOX
HPI:  58 yowoman PMH of HTN and HLD who presents with headache and  L sided weakness. Today feeling much better, some headache still, but weakness improved.    ROS:     MEDICATIONS  (STANDING):  atorvastatin 20 milliGRAM(s) Oral at bedtime  cyclobenzaprine 5 milliGRAM(s) Oral three times a day  dexAMETHasone  Injectable 4 milliGRAM(s) IV Push every 6 hours  enoxaparin Injectable 40 milliGRAM(s) SubCutaneous every 24 hours  losartan 50 milliGRAM(s) Oral daily  pantoprazole    Tablet 40 milliGRAM(s) Oral before breakfast    MEDICATIONS  (PRN):  acetaminophen     Tablet .. 650 milliGRAM(s) Oral every 6 hours PRN Temp greater or equal to 38C (100.4F), Mild Pain (1 - 3)  ondansetron Injectable 4 milliGRAM(s) IV Push every 8 hours PRN Nausea and/or Vomiting      Vital Signs Last 24 Hrs  T(C): 36.7 (28 Dec 2023 08:59), Max: 36.8 (27 Dec 2023 21:01)  T(F): 98 (28 Dec 2023 08:59), Max: 98.3 (27 Dec 2023 21:01)  HR: 67 (28 Dec 2023 08:59) (67 - 84)  BP: 138/74 (28 Dec 2023 08:59) (102/58 - 138/74)  BP(mean): --  RR: 18 (28 Dec 2023 08:59) (18 - 19)  SpO2: 95% (28 Dec 2023 08:59) (95% - 97%)    Parameters below as of 28 Dec 2023 08:59  Patient On (Oxygen Delivery Method): room air      Neurological Exam:    HF: Patient is alert and oriented x 3. There is no aphasia or dysarthria. Follows complex commands.   CN: Vision is intact to confrontation. Pupils are equal and reactive. Extra ocular muscles are intact. There is no facial droop or asymmetry. Tongue is midline. Sensation is intact in the face. Other CN II-XII are intact.   Motor: motor examination all muscles are 5-/5 and there is no pronator drift.   Sensory: intact totouch.   DTR: 1-2/4 all 4 extremities. Babinski is negative bilateral.  Co-ord:  Finger to finger to nose is intact.    Gait/balance: Patient ambulates without difficulty.     < from: MR Head No Cont (12.26.23 @ 10:14) >    IMPRESSION:    No hydrocephalus, mass effect, acute intracranial hemorrhage, vasogenic   edema, restricted diffusion, or acute territorial infarct.    < end of copied text >  < from: MR Cervical Spine No Cont (12.26.23 @ 10:14) >  IMPRESSION: Degenerative changes as described above.    < end of copied text >  < from: CT Angio Neck Stroke Protocol w/ IV Cont (12.25.23 @ 09:32) >    IMPRESSION: Unremarkable CTA of the head and neck. No large vessel   occlusion. Normal CT perfusion.    < end of copied text >

## 2023-12-28 NOTE — PROGRESS NOTE ADULT - ASSESSMENT
58 year old woman hx of HTN, c/o headache, left sided numbness, weakness. Improved, still c/o weakness on left. ; MR head, CT head, Ct angios negative for acute findings. MR srinivasan spine, mx level DDD. Likely complicated migraine. Prolonged duration of stated weakness, mr srinivasan-spine doesn't explain her sxs.  Suggest:  f/u B/p. Aim systolic <140  Can add nortriptyline 25 mg o hs  Can f/u with my office after d/c

## 2023-12-28 NOTE — DISCHARGE NOTE NURSING/CASE MANAGEMENT/SOCIAL WORK - PATIENT PORTAL LINK FT
You can access the FollowMyHealth Patient Portal offered by [facility] by registering at the following website: [email or web address]. By joining [employer]’s FollowMyHealth portal, you will also be able to view your health information using other applications (apps) compatible with our system. You can access the FollowMyHealth Patient Portal offered by [facility] by registering at the following website: [email or web address]. By joining [employer]’s FollowMyHealth portal, you will also be able to view your health information using other applications (apps) compatible with our system.

## 2023-12-28 NOTE — DISCHARGE NOTE PROVIDER - NSDCCPCAREPLAN_GEN_ALL_CORE_FT
PRINCIPAL DISCHARGE DIAGNOSIS  Diagnosis: Left-sided weakness  Assessment and Plan of Treatment: likely due to cevical stenosis  improving  continue decadron and flexeril   f/u with Neuro surgery and neurology in 1 week  f/u with your PCP in2-3 days      SECONDARY DISCHARGE DIAGNOSES  Diagnosis: Abnormal TSH  Assessment and Plan of Treatment: f/u with PCP for further work up    Diagnosis: HTN (hypertension)  Assessment and Plan of Treatment: continue losartan  f/u with PCP    Diagnosis: Prediabetes  Assessment and Plan of Treatment: diet and exercise  f/u with PCP

## 2023-12-29 VITALS
OXYGEN SATURATION: 96 % | HEART RATE: 60 BPM | RESPIRATION RATE: 18 BRPM | SYSTOLIC BLOOD PRESSURE: 127 MMHG | DIASTOLIC BLOOD PRESSURE: 74 MMHG | TEMPERATURE: 98 F

## 2023-12-29 PROCEDURE — 99232 SBSQ HOSP IP/OBS MODERATE 35: CPT

## 2023-12-29 PROCEDURE — 99239 HOSP IP/OBS DSCHRG MGMT >30: CPT

## 2023-12-29 RX ADMIN — CYCLOBENZAPRINE HYDROCHLORIDE 5 MILLIGRAM(S): 10 TABLET, FILM COATED ORAL at 05:48

## 2023-12-29 RX ADMIN — PANTOPRAZOLE SODIUM 40 MILLIGRAM(S): 20 TABLET, DELAYED RELEASE ORAL at 06:25

## 2023-12-29 RX ADMIN — Medication 4 MILLIGRAM(S): at 06:24

## 2023-12-29 RX ADMIN — LOSARTAN POTASSIUM 50 MILLIGRAM(S): 100 TABLET, FILM COATED ORAL at 09:57

## 2023-12-29 RX ADMIN — Medication 4 MILLIGRAM(S): at 09:57

## 2023-12-29 RX ADMIN — Medication 4 MILLIGRAM(S): at 00:58

## 2023-12-29 NOTE — PROGRESS NOTE ADULT - SUBJECTIVE AND OBJECTIVE BOX
Neurosurgery Progress Note     HPI:   59 yo Romanian-speaking F with a PMH of HTN and HLD who presents with acute L sided weakness. She was sleeping at night when at about 2AM, she felt a sharp mid-sternal CP, 8/10 in severity, radiating to the back, with associated SOB, and also noticed L arm weakness. She felt her L side was numb as well. She also felt at the time L sided headaches. She also noticed blurry L sided vision. The symptoms all worsened until she got to the ED and received aspirin. She states she has been getting similar symptoms for months "when my blood pressure goes high." She has been taking losartan but ran out of her medication 2 days ago. She denies N/V, diarrhea, constipation, or dizziness. She is unsure if she had some ringing in her ears earlier. She notes she was told she had a "big heart," but never had anything done for it.    12/29/23 : Seen this am pain is better weakness of the LUE improved no Lower extremity weakness appreciated on exam     MEDICATIONS  (STANDING):  atorvastatin 20 milliGRAM(s) Oral at bedtime  cyclobenzaprine 5 milliGRAM(s) Oral three times a day  dexAMETHasone  Injectable 4 milliGRAM(s) IV Push every 6 hours  enoxaparin Injectable 40 milliGRAM(s) SubCutaneous every 24 hours  losartan 50 milliGRAM(s) Oral daily  pantoprazole    Tablet 40 milliGRAM(s) Oral before breakfast    MEDICATIONS  (PRN):  acetaminophen     Tablet .. 650 milliGRAM(s) Oral every 6 hours PRN Temp greater or equal to 38C (100.4F), Mild Pain (1 - 3)  ondansetron Injectable 4 milliGRAM(s) IV Push every 8 hours PRN Nausea and/or Vomiting      Allergies    No Known Allergies    Intolerances    T(C): 36.8 (12-29-23 @ 08:11), Max: 37.4 (12-28-23 @ 23:50)  HR: 60 (12-29-23 @ 08:11) (51 - 68)  BP: 127/74 (12-29-23 @ 08:11) (121/59 - 133/71)  RR: 18 (12-29-23 @ 08:11) (18 - 18)  SpO2: 96% (12-29-23 @ 08:11) (96% - 97%)  acetaminophen     Tablet .. 650 milliGRAM(s) Oral every 6 hours PRN  atorvastatin 20 milliGRAM(s) Oral at bedtime  cyclobenzaprine 5 milliGRAM(s) Oral three times a day  dexAMETHasone  Injectable 4 milliGRAM(s) IV Push every 6 hours  enoxaparin Injectable 40 milliGRAM(s) SubCutaneous every 24 hours  losartan 50 milliGRAM(s) Oral daily  ondansetron Injectable 4 milliGRAM(s) IV Push every 8 hours PRN  pantoprazole    Tablet 40 milliGRAM(s) Oral before breakfast      PHYSICAL EXAM:    GENERAL: NAD, well-groomed, well-developed  HEAD:  Atraumatic, Normocephalic  EYES: EOMI, PERRLA, conjunctiva and sclera clear  NEURO:  Awake  alert oriented to self, place situation   Fund of knowledge, recent and remote memory are intact   Mood; normal affect   CN II-XII intact PERRL, EOMI, no ptosis, no Nystagmus, normal shoulder shrug and head turning   Face symmetrical tongue is midline speech is clear and fluent  Motor: 5/5 UE/LE all muscle groups , Mild give way weakness of the LUE   Sensory: No deficit to light touch   Gait is not tested      LABS: none new     Imaging :     ACC: [id number] EXAM:  MR SPINE CERVICAL   ORDERED BY: ERNESTO HOWELL     PROCEDURE DATE:  12/26/2023      INTERPRETATION:  Clinical indication: Neck pain.    MRI of the cervical spine was performed sagittal T1-T2 and STIR sequence.   Axial T2 andgradient echo sequence was performed as well.    Loss of the normal cervical lordosis seen.    Hemangioma is seen involving the T2 vertebral body.    Disc desiccation is seen throughout the cervical spine region. This most   prominent at the C5-6 level is secondary to chronic degenerative    C2-3: Normal    C3-4: Disc bulge and bilateral hypertrophic facet change. Mild narrowing   of the left neural foramen and moderate narrowing of the right neural   foramen    C4-5: Disc bulge and central disc protrusion is seen. Mild narrowing of   the spinal canal.    C5-6: Disc bulge and bilateral hypertrophic facet joint changes seen.   Moderate narrowing of the left neural foramen.    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    T1-2: Left-sidednerve root sleeve cyst is seen.    The spinal cord demonstrates normal signal and caliber.    Evaluation of the paraspinal soft tissues appear normal.    IMPRESSION: Degenerative changes as described above.    --- End of Report ---         Neurosurgery Progress Note     HPI:   59 yo English-speaking F with a PMH of HTN and HLD who presents with acute L sided weakness. She was sleeping at night when at about 2AM, she felt a sharp mid-sternal CP, 8/10 in severity, radiating to the back, with associated SOB, and also noticed L arm weakness. She felt her L side was numb as well. She also felt at the time L sided headaches. She also noticed blurry L sided vision. The symptoms all worsened until she got to the ED and received aspirin. She states she has been getting similar symptoms for months "when my blood pressure goes high." She has been taking losartan but ran out of her medication 2 days ago. She denies N/V, diarrhea, constipation, or dizziness. She is unsure if she had some ringing in her ears earlier. She notes she was told she had a "big heart," but never had anything done for it.    12/29/23 : Seen this am pain is better weakness of the LUE improved no Lower extremity weakness appreciated on exam     MEDICATIONS  (STANDING):  atorvastatin 20 milliGRAM(s) Oral at bedtime  cyclobenzaprine 5 milliGRAM(s) Oral three times a day  dexAMETHasone  Injectable 4 milliGRAM(s) IV Push every 6 hours  enoxaparin Injectable 40 milliGRAM(s) SubCutaneous every 24 hours  losartan 50 milliGRAM(s) Oral daily  pantoprazole    Tablet 40 milliGRAM(s) Oral before breakfast    MEDICATIONS  (PRN):  acetaminophen     Tablet .. 650 milliGRAM(s) Oral every 6 hours PRN Temp greater or equal to 38C (100.4F), Mild Pain (1 - 3)  ondansetron Injectable 4 milliGRAM(s) IV Push every 8 hours PRN Nausea and/or Vomiting      Allergies    No Known Allergies    Intolerances    T(C): 36.8 (12-29-23 @ 08:11), Max: 37.4 (12-28-23 @ 23:50)  HR: 60 (12-29-23 @ 08:11) (51 - 68)  BP: 127/74 (12-29-23 @ 08:11) (121/59 - 133/71)  RR: 18 (12-29-23 @ 08:11) (18 - 18)  SpO2: 96% (12-29-23 @ 08:11) (96% - 97%)  acetaminophen     Tablet .. 650 milliGRAM(s) Oral every 6 hours PRN  atorvastatin 20 milliGRAM(s) Oral at bedtime  cyclobenzaprine 5 milliGRAM(s) Oral three times a day  dexAMETHasone  Injectable 4 milliGRAM(s) IV Push every 6 hours  enoxaparin Injectable 40 milliGRAM(s) SubCutaneous every 24 hours  losartan 50 milliGRAM(s) Oral daily  ondansetron Injectable 4 milliGRAM(s) IV Push every 8 hours PRN  pantoprazole    Tablet 40 milliGRAM(s) Oral before breakfast      PHYSICAL EXAM:    GENERAL: NAD, well-groomed, well-developed  HEAD:  Atraumatic, Normocephalic  EYES: EOMI, PERRLA, conjunctiva and sclera clear  NEURO:  Awake  alert oriented to self, place situation   Fund of knowledge, recent and remote memory are intact   Mood; normal affect   CN II-XII intact PERRL, EOMI, no ptosis, no Nystagmus, normal shoulder shrug and head turning   Face symmetrical tongue is midline speech is clear and fluent  Motor: 5/5 UE/LE all muscle groups , Mild give way weakness of the LUE   Sensory: No deficit to light touch   Gait is not tested      LABS: none new     Imaging :     ACC: [id number] EXAM:  MR SPINE CERVICAL   ORDERED BY: ERNESTO HOWELL     PROCEDURE DATE:  12/26/2023      INTERPRETATION:  Clinical indication: Neck pain.    MRI of the cervical spine was performed sagittal T1-T2 and STIR sequence.   Axial T2 andgradient echo sequence was performed as well.    Loss of the normal cervical lordosis seen.    Hemangioma is seen involving the T2 vertebral body.    Disc desiccation is seen throughout the cervical spine region. This most   prominent at the C5-6 level is secondary to chronic degenerative    C2-3: Normal    C3-4: Disc bulge and bilateral hypertrophic facet change. Mild narrowing   of the left neural foramen and moderate narrowing of the right neural   foramen    C4-5: Disc bulge and central disc protrusion is seen. Mild narrowing of   the spinal canal.    C5-6: Disc bulge and bilateral hypertrophic facet joint changes seen.   Moderate narrowing of the left neural foramen.    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    T1-2: Left-sidednerve root sleeve cyst is seen.    The spinal cord demonstrates normal signal and caliber.    Evaluation of the paraspinal soft tissues appear normal.    IMPRESSION: Degenerative changes as described above.    --- End of Report ---

## 2023-12-29 NOTE — PROGRESS NOTE ADULT - PROVIDER SPECIALTY LIST ADULT
Hospitalist
Hospitalist
Neurology
Neurosurgery
Neurosurgery
Hospitalist
Neurosurgery

## 2023-12-29 NOTE — PROGRESS NOTE ADULT - NSPROGADDITIONALINFOA_GEN_ALL_CORE
A/P: 57 yo Kyrgyz-speaking F with a PMH of HTN and HLD who presents with acute L sided weakness. Was given Plavix load,  in ER, admitted to telemetry for stroke workup.  Neurosurgery was consulted for cervical stenosis on MRI.    1. Appreciate Neurology input out pt follow up   2. MR of T / L spine for full neuro-axis reviewed   MRI C Spine with Disc osteophyte complex of C 4/5 C 5/6 symptoms  Improved on steroids  Would send Pt home on Medrol dose Shar   3. Pt may follow up with Dr. Marsh upon discharge   4. Pt is stable for discharge home   5. D/w Attending Dr. Marsh   d/w Dr Ibarra who was asked to review case for second opinion A/P: 57 yo Vietnamese-speaking F with a PMH of HTN and HLD who presents with acute L sided weakness. Was given Plavix load,  in ER, admitted to telemetry for stroke workup.  Neurosurgery was consulted for cervical stenosis on MRI.    1. Appreciate Neurology input out pt follow up   2. MR of T / L spine for full neuro-axis reviewed   MRI C Spine with Disc osteophyte complex of C 4/5 C 5/6 symptoms  Improved on steroids  Would send Pt home on Medrol dose Shar   3. Pt may follow up with Dr. Marsh upon discharge   4. Pt is stable for discharge home   5. D/w Attending Dr. Marsh   d/w Dr Ibarra who was asked to review case for second opinion

## 2024-01-02 ENCOUNTER — APPOINTMENT (OUTPATIENT)
Dept: CARDIOLOGY | Facility: CLINIC | Age: 59
End: 2024-01-02

## 2024-01-02 PROBLEM — E78.5 HYPERLIPIDEMIA, UNSPECIFIED: Chronic | Status: ACTIVE | Noted: 2023-12-25

## 2024-01-02 PROBLEM — I10 ESSENTIAL (PRIMARY) HYPERTENSION: Chronic | Status: ACTIVE | Noted: 2023-12-25

## 2024-01-03 DIAGNOSIS — I10 ESSENTIAL (PRIMARY) HYPERTENSION: ICD-10-CM

## 2024-01-03 DIAGNOSIS — M48.02 SPINAL STENOSIS, CERVICAL REGION: ICD-10-CM

## 2024-01-03 DIAGNOSIS — Z82.49 FAMILY HISTORY OF ISCHEMIC HEART DISEASE AND OTHER DISEASES OF THE CIRCULATORY SYSTEM: ICD-10-CM

## 2024-01-03 DIAGNOSIS — M50.322 OTHER CERVICAL DISC DEGENERATION AT C5-C6 LEVEL: ICD-10-CM

## 2024-01-03 DIAGNOSIS — E78.5 HYPERLIPIDEMIA, UNSPECIFIED: ICD-10-CM

## 2024-01-03 DIAGNOSIS — R07.89 OTHER CHEST PAIN: ICD-10-CM

## 2024-01-03 DIAGNOSIS — R73.03 PREDIABETES: ICD-10-CM

## 2024-01-03 DIAGNOSIS — G96.191 PERINEURAL CYST: ICD-10-CM

## 2024-01-03 DIAGNOSIS — E03.9 HYPOTHYROIDISM, UNSPECIFIED: ICD-10-CM

## 2024-01-04 NOTE — ASSESSMENT
[FreeTextEntry1] : Hypertension  Reports labile BP.  BP well controlled [id number] Reports adverse sx with irbesartan twice daily will switch to losartan 50mg daily and recheck BP   Hx "big heart"  unclear if hx cardiomegaly; does not describe sx HF.  Will obtain echocardiogram   Atypical chest pain  Reports chest and back pain; occasionally worse with exertion  Does have a family hc CAD.  Will obtain coronary CT  Cont home aspirin for now (she has been taking for many years)   HLD  Will need to obtain lab tests from PCP cont atorvastatin   Will f/u with patient in 1 month.

## 2024-01-04 NOTE — HISTORY OF PRESENT ILLNESS
[FreeTextEntry1] : Ms. Antony is a 57 yo Filipino speaking female with a hx hypertension and dylipidemia referred to cardiology to establish care.   Pt recently moved from [facility] 2 months ago. She was seen by PCP Dr. Geovanna Hatfield.  Reports ongoing intermittent chest and back pain for several. Worse when lying down. Occasionally related to exertion. Describes cough and some chest pressure with exertion.  Also notes labile blood pressure recently.  She has no personal hx CAD. She was told she had a "big heart in the past" and has been on aspirin for 2 years for "numbness in hand".   Lifetime non-smoker, no alcohol or drug use.  She exercises- walking daily.  family hx includes father and brother with cardiac arrest; details unknown.

## 2024-01-04 NOTE — REVIEW OF SYSTEMS
[Chest Discomfort] : chest discomfort [Negative] : Heme/Lymph [Dyspnea on exertion] : not dyspnea during exertion [Lower Ext Edema] : no extremity edema [Leg Claudication] : no intermittent leg claudication [Palpitations] : no palpitations [Orthopnea] : no orthopnea [PND] : no PND [Syncope] : no syncope

## 2024-02-06 ENCOUNTER — APPOINTMENT (OUTPATIENT)
Dept: CARDIOLOGY | Facility: CLINIC | Age: 59
End: 2024-02-06
Payer: COMMERCIAL

## 2024-02-06 VITALS
HEIGHT: 59 IN | HEART RATE: 82 BPM | BODY MASS INDEX: 33.67 KG/M2 | DIASTOLIC BLOOD PRESSURE: 86 MMHG | WEIGHT: 167 LBS | SYSTOLIC BLOOD PRESSURE: 120 MMHG | OXYGEN SATURATION: 99 %

## 2024-02-06 DIAGNOSIS — I25.10 ATHEROSCLEROTIC HEART DISEASE OF NATIVE CORONARY ARTERY W/OUT ANGINA PECTORIS: ICD-10-CM

## 2024-02-06 PROCEDURE — 93000 ELECTROCARDIOGRAM COMPLETE: CPT

## 2024-02-06 PROCEDURE — 99214 OFFICE O/P EST MOD 30 MIN: CPT | Mod: 25

## 2024-02-06 PROCEDURE — G2211 COMPLEX E/M VISIT ADD ON: CPT | Mod: NC,1L

## 2024-02-08 NOTE — ASSESSMENT
[FreeTextEntry1] : Hypertension   BP well controlled today  cont losartan - clarify dose next visit   Noncardiac chest pain  echo showed normal LV function., mild MR, mild TR.  A coronary CT showed no evidence of obstructive disease but with mild LAD, LM and circumflex stenoses, calcium score 134.  Cont home aspirin for now (she has been taking for many years)  HLD   cont atorvastatin   Will f/u with patient in 3 months.

## 2024-02-08 NOTE — CARDIOLOGY SUMMARY
[de-identified] : CONCLUSIONS: 1. Left ventricular wall thickness is normal. Left ventricular systolic function is normal with an ejection fraction visually estimated at 60 to 65 %. 2. Normal right ventricular cavity size and systolic function. 3. Mild mitral regurgitation. 4. Mild tricuspid regurgitation.

## 2024-02-08 NOTE — HISTORY OF PRESENT ILLNESS
[FreeTextEntry1] : Ms. Antony is a 59 yo Guamanian speaking female with a hx hypertension and dyslipidemia presenting for a routine follow up.   At our last visit, she c/o intermittent chest and back pain. No personal hx CAD.  She was told she had a "big heart in the past" and has been on aspirin for 2 years for "numbness in hand".   She had an echo that showed normal LV function., mild MR, mild TR.  A coronary CT showed no evidence of obstructive disease but with mild LAD, LM and circumflex stenoses, calcium score 134.   Lifetime non-smoker, no alcohol or drug use.  She exercises- walking daily.  Family hx includes father and brother with cardiac arrest; details unknown.  Recently moved from [facility]

## 2024-02-23 ENCOUNTER — RESULT REVIEW (OUTPATIENT)
Age: 59
End: 2024-02-23

## 2024-02-28 ENCOUNTER — RESULT REVIEW (OUTPATIENT)
Age: 59
End: 2024-02-28

## 2024-02-28 ENCOUNTER — OUTPATIENT (OUTPATIENT)
Dept: OUTPATIENT SERVICES | Facility: HOSPITAL | Age: 59
LOS: 1 days | End: 2024-02-28
Payer: COMMERCIAL

## 2024-02-28 ENCOUNTER — APPOINTMENT (OUTPATIENT)
Dept: MAMMOGRAPHY | Facility: CLINIC | Age: 59
End: 2024-02-28
Payer: COMMERCIAL

## 2024-02-28 DIAGNOSIS — Z01.419 ENCOUNTER FOR GYNECOLOGICAL EXAMINATION (GENERAL) (ROUTINE) WITHOUT ABNORMAL FINDINGS: ICD-10-CM

## 2024-02-28 DIAGNOSIS — N28.1 CYST OF KIDNEY, ACQUIRED: Chronic | ICD-10-CM

## 2024-02-28 PROCEDURE — 77063 BREAST TOMOSYNTHESIS BI: CPT

## 2024-02-28 PROCEDURE — 77067 SCR MAMMO BI INCL CAD: CPT

## 2024-02-28 PROCEDURE — 77063 BREAST TOMOSYNTHESIS BI: CPT | Mod: 26

## 2024-02-28 PROCEDURE — 77067 SCR MAMMO BI INCL CAD: CPT | Mod: 26

## 2024-03-13 ENCOUNTER — RESULT REVIEW (OUTPATIENT)
Age: 59
End: 2024-03-13

## 2024-03-13 ENCOUNTER — APPOINTMENT (OUTPATIENT)
Dept: MAMMOGRAPHY | Facility: CLINIC | Age: 59
End: 2024-03-13
Payer: COMMERCIAL

## 2024-03-13 ENCOUNTER — OUTPATIENT (OUTPATIENT)
Dept: OUTPATIENT SERVICES | Facility: HOSPITAL | Age: 59
LOS: 1 days | End: 2024-03-13
Payer: COMMERCIAL

## 2024-03-13 DIAGNOSIS — N28.1 CYST OF KIDNEY, ACQUIRED: Chronic | ICD-10-CM

## 2024-03-13 DIAGNOSIS — Z00.8 ENCOUNTER FOR OTHER GENERAL EXAMINATION: ICD-10-CM

## 2024-03-13 PROCEDURE — 77065 DX MAMMO INCL CAD UNI: CPT | Mod: 26,LT

## 2024-03-13 PROCEDURE — 77065 DX MAMMO INCL CAD UNI: CPT

## 2024-03-16 ENCOUNTER — OUTPATIENT (OUTPATIENT)
Dept: OUTPATIENT SERVICES | Facility: HOSPITAL | Age: 59
LOS: 1 days | End: 2024-03-16
Payer: COMMERCIAL

## 2024-03-16 ENCOUNTER — RESULT REVIEW (OUTPATIENT)
Age: 59
End: 2024-03-16

## 2024-03-16 ENCOUNTER — APPOINTMENT (OUTPATIENT)
Dept: ULTRASOUND IMAGING | Facility: CLINIC | Age: 59
End: 2024-03-16
Payer: COMMERCIAL

## 2024-03-16 DIAGNOSIS — N28.1 CYST OF KIDNEY, ACQUIRED: Chronic | ICD-10-CM

## 2024-03-16 DIAGNOSIS — Z00.8 ENCOUNTER FOR OTHER GENERAL EXAMINATION: ICD-10-CM

## 2024-03-16 PROCEDURE — 76856 US EXAM PELVIC COMPLETE: CPT | Mod: 26

## 2024-03-16 PROCEDURE — 76856 US EXAM PELVIC COMPLETE: CPT

## 2024-03-16 PROCEDURE — 76830 TRANSVAGINAL US NON-OB: CPT

## 2024-03-16 PROCEDURE — 76830 TRANSVAGINAL US NON-OB: CPT | Mod: 26

## 2024-03-19 ENCOUNTER — OUTPATIENT (OUTPATIENT)
Dept: OUTPATIENT SERVICES | Facility: HOSPITAL | Age: 59
LOS: 1 days | End: 2024-03-19
Payer: COMMERCIAL

## 2024-03-19 ENCOUNTER — RESULT REVIEW (OUTPATIENT)
Age: 59
End: 2024-03-19

## 2024-03-19 ENCOUNTER — APPOINTMENT (OUTPATIENT)
Dept: MAMMOGRAPHY | Facility: CLINIC | Age: 59
End: 2024-03-19
Payer: COMMERCIAL

## 2024-03-19 DIAGNOSIS — R92.8 OTHER ABNORMAL AND INCONCLUSIVE FINDINGS ON DIAGNOSTIC IMAGING OF BREAST: ICD-10-CM

## 2024-03-19 DIAGNOSIS — Z00.8 ENCOUNTER FOR OTHER GENERAL EXAMINATION: ICD-10-CM

## 2024-03-19 DIAGNOSIS — N28.1 CYST OF KIDNEY, ACQUIRED: Chronic | ICD-10-CM

## 2024-03-19 PROCEDURE — 19081 BX BREAST 1ST LESION STRTCTC: CPT | Mod: LT

## 2024-03-19 PROCEDURE — 77065 DX MAMMO INCL CAD UNI: CPT

## 2024-03-19 PROCEDURE — 88305 TISSUE EXAM BY PATHOLOGIST: CPT

## 2024-03-19 PROCEDURE — 77065 DX MAMMO INCL CAD UNI: CPT | Mod: 26,LT

## 2024-03-19 PROCEDURE — A4648: CPT

## 2024-03-19 PROCEDURE — 88305 TISSUE EXAM BY PATHOLOGIST: CPT | Mod: 26

## 2024-03-19 PROCEDURE — 19081 BX BREAST 1ST LESION STRTCTC: CPT

## 2024-03-22 ENCOUNTER — APPOINTMENT (OUTPATIENT)
Dept: ULTRASOUND IMAGING | Facility: CLINIC | Age: 59
End: 2024-03-22

## 2024-03-29 ENCOUNTER — RESULT REVIEW (OUTPATIENT)
Age: 59
End: 2024-03-29

## 2024-04-09 PROBLEM — Z12.4 CERVICAL CANCER SCREENING: Status: ACTIVE | Noted: 2024-04-09

## 2024-04-09 PROBLEM — Z12.11 COLON CANCER SCREENING: Status: ACTIVE | Noted: 2024-04-09

## 2024-04-15 ENCOUNTER — APPOINTMENT (OUTPATIENT)
Dept: OBGYN | Facility: CLINIC | Age: 59
End: 2024-04-15
Payer: COMMERCIAL

## 2024-04-15 DIAGNOSIS — Z12.11 ENCOUNTER FOR SCREENING FOR MALIGNANT NEOPLASM OF COLON: ICD-10-CM

## 2024-04-15 DIAGNOSIS — Z12.4 ENCOUNTER FOR SCREENING FOR MALIGNANT NEOPLASM OF CERVIX: ICD-10-CM

## 2024-04-15 PROCEDURE — 99459 PELVIC EXAMINATION: CPT

## 2024-04-15 PROCEDURE — 99203 OFFICE O/P NEW LOW 30 MIN: CPT

## 2024-04-16 NOTE — HISTORY OF PRESENT ILLNESS
[FreeTextEntry1] : Patient is a 58yo  female here today for initial visit for EML thickness 11mm, was referred by  who was unable to perfrom EMB despite using cytotec. Denies PMB. Pt denies cryotherapy   mother passed away from uterine cancer

## 2024-04-16 NOTE — PHYSICAL EXAM
[Chaperone Present] : A chaperone was present in the examining room during all aspects of the physical examination [Labia Majora] : normal [Labia Minora] : normal [Atrophy] : atrophy [Normal] : normal [Uterine Adnexae] : normal [FreeTextEntry2] : Carl FNP-BC [FreeTextEntry4] : narrow

## 2024-04-16 NOTE — PLAN
[FreeTextEntry1] : discussed in detail treatment for uterine fibroids and AUB. she would be a great candidate for Hysteroscopic D&C polypectomy Risks and benefits discussed in detail. see scanned in image patient to follow up for final decision for surgery pending pre op sono all of her questions regarding procedure were answered she is agreeable with plan

## 2024-04-23 DIAGNOSIS — Z80.49 FAMILY HISTORY OF MALIGNANT NEOPLASM OF OTHER GENITAL ORGANS: ICD-10-CM

## 2024-04-23 RX ORDER — ALBUTEROL SULFATE 90 UG/1
108 (90 BASE) INHALANT RESPIRATORY (INHALATION)
Refills: 0 | Status: ACTIVE | COMMUNITY

## 2024-04-30 ENCOUNTER — OUTPATIENT (OUTPATIENT)
Dept: OUTPATIENT SERVICES | Facility: HOSPITAL | Age: 59
LOS: 1 days | End: 2024-04-30
Payer: COMMERCIAL

## 2024-04-30 VITALS
WEIGHT: 166.89 LBS | DIASTOLIC BLOOD PRESSURE: 88 MMHG | TEMPERATURE: 98 F | HEIGHT: 61 IN | HEART RATE: 76 BPM | SYSTOLIC BLOOD PRESSURE: 143 MMHG | OXYGEN SATURATION: 100 % | RESPIRATION RATE: 16 BRPM

## 2024-04-30 DIAGNOSIS — N28.1 CYST OF KIDNEY, ACQUIRED: Chronic | ICD-10-CM

## 2024-04-30 DIAGNOSIS — Z01.818 ENCOUNTER FOR OTHER PREPROCEDURAL EXAMINATION: ICD-10-CM

## 2024-04-30 LAB
ANION GAP SERPL CALC-SCNC: 3 MMOL/L — LOW (ref 5–17)
APPEARANCE UR: CLEAR — SIGNIFICANT CHANGE UP
BASOPHILS # BLD AUTO: 0.07 K/UL — SIGNIFICANT CHANGE UP (ref 0–0.2)
BASOPHILS NFR BLD AUTO: 1.1 % — SIGNIFICANT CHANGE UP (ref 0–2)
BILIRUB UR-MCNC: NEGATIVE — SIGNIFICANT CHANGE UP
BLD GP AB SCN SERPL QL: SIGNIFICANT CHANGE UP
BUN SERPL-MCNC: 15 MG/DL — SIGNIFICANT CHANGE UP (ref 7–23)
CALCIUM SERPL-MCNC: 9.4 MG/DL — SIGNIFICANT CHANGE UP (ref 8.5–10.1)
CHLORIDE SERPL-SCNC: 108 MMOL/L — SIGNIFICANT CHANGE UP (ref 96–108)
CO2 SERPL-SCNC: 27 MMOL/L — SIGNIFICANT CHANGE UP (ref 22–31)
COLOR SPEC: YELLOW — SIGNIFICANT CHANGE UP
CREAT SERPL-MCNC: 0.88 MG/DL — SIGNIFICANT CHANGE UP (ref 0.5–1.3)
DIFF PNL FLD: NEGATIVE — SIGNIFICANT CHANGE UP
EGFR: 75 ML/MIN/1.73M2 — SIGNIFICANT CHANGE UP
EOSINOPHIL # BLD AUTO: 0.09 K/UL — SIGNIFICANT CHANGE UP (ref 0–0.5)
EOSINOPHIL NFR BLD AUTO: 1.4 % — SIGNIFICANT CHANGE UP (ref 0–6)
GLUCOSE SERPL-MCNC: 98 MG/DL — SIGNIFICANT CHANGE UP (ref 70–99)
GLUCOSE UR QL: NEGATIVE MG/DL — SIGNIFICANT CHANGE UP
HCT VFR BLD CALC: 39.1 % — SIGNIFICANT CHANGE UP (ref 34.5–45)
HGB BLD-MCNC: 12.7 G/DL — SIGNIFICANT CHANGE UP (ref 11.5–15.5)
IMM GRANULOCYTES NFR BLD AUTO: 0.2 % — SIGNIFICANT CHANGE UP (ref 0–0.9)
KETONES UR-MCNC: NEGATIVE MG/DL — SIGNIFICANT CHANGE UP
LEUKOCYTE ESTERASE UR-ACNC: NEGATIVE — SIGNIFICANT CHANGE UP
LYMPHOCYTES # BLD AUTO: 2.25 K/UL — SIGNIFICANT CHANGE UP (ref 1–3.3)
LYMPHOCYTES # BLD AUTO: 36.1 % — SIGNIFICANT CHANGE UP (ref 13–44)
MCHC RBC-ENTMCNC: 28.8 PG — SIGNIFICANT CHANGE UP (ref 27–34)
MCHC RBC-ENTMCNC: 32.5 GM/DL — SIGNIFICANT CHANGE UP (ref 32–36)
MCV RBC AUTO: 88.7 FL — SIGNIFICANT CHANGE UP (ref 80–100)
MONOCYTES # BLD AUTO: 0.82 K/UL — SIGNIFICANT CHANGE UP (ref 0–0.9)
MONOCYTES NFR BLD AUTO: 13.1 % — SIGNIFICANT CHANGE UP (ref 2–14)
NEUTROPHILS # BLD AUTO: 3 K/UL — SIGNIFICANT CHANGE UP (ref 1.8–7.4)
NEUTROPHILS NFR BLD AUTO: 48.1 % — SIGNIFICANT CHANGE UP (ref 43–77)
NITRITE UR-MCNC: NEGATIVE — SIGNIFICANT CHANGE UP
PH UR: 6 — SIGNIFICANT CHANGE UP (ref 5–8)
PLATELET # BLD AUTO: 249 K/UL — SIGNIFICANT CHANGE UP (ref 150–400)
POTASSIUM SERPL-MCNC: 3.8 MMOL/L — SIGNIFICANT CHANGE UP (ref 3.5–5.3)
POTASSIUM SERPL-SCNC: 3.8 MMOL/L — SIGNIFICANT CHANGE UP (ref 3.5–5.3)
PROT UR-MCNC: NEGATIVE MG/DL — SIGNIFICANT CHANGE UP
RBC # BLD: 4.41 M/UL — SIGNIFICANT CHANGE UP (ref 3.8–5.2)
RBC # FLD: 13 % — SIGNIFICANT CHANGE UP (ref 10.3–14.5)
SODIUM SERPL-SCNC: 138 MMOL/L — SIGNIFICANT CHANGE UP (ref 135–145)
SP GR SPEC: 1.01 — SIGNIFICANT CHANGE UP (ref 1–1.03)
UROBILINOGEN FLD QL: 0.2 MG/DL — SIGNIFICANT CHANGE UP (ref 0.2–1)
WBC # BLD: 6.24 K/UL — SIGNIFICANT CHANGE UP (ref 3.8–10.5)
WBC # FLD AUTO: 6.24 K/UL — SIGNIFICANT CHANGE UP (ref 3.8–10.5)

## 2024-04-30 PROCEDURE — 93005 ELECTROCARDIOGRAM TRACING: CPT

## 2024-04-30 PROCEDURE — 86901 BLOOD TYPING SEROLOGIC RH(D): CPT

## 2024-04-30 PROCEDURE — 80048 BASIC METABOLIC PNL TOTAL CA: CPT

## 2024-04-30 PROCEDURE — 36415 COLL VENOUS BLD VENIPUNCTURE: CPT

## 2024-04-30 PROCEDURE — 86850 RBC ANTIBODY SCREEN: CPT

## 2024-04-30 PROCEDURE — 86900 BLOOD TYPING SEROLOGIC ABO: CPT

## 2024-04-30 PROCEDURE — 93010 ELECTROCARDIOGRAM REPORT: CPT

## 2024-04-30 PROCEDURE — 81003 URINALYSIS AUTO W/O SCOPE: CPT

## 2024-04-30 PROCEDURE — 85025 COMPLETE CBC W/AUTO DIFF WBC: CPT

## 2024-04-30 PROCEDURE — 99214 OFFICE O/P EST MOD 30 MIN: CPT | Mod: 25

## 2024-04-30 NOTE — H&P PST ADULT - ASSESSMENT
59 years old female present to PST prior to dilation and curettage, hysteroscopy, polypectomy with Dr. Young.    Patient denies signs and symptoms of Covid 19 such as shortness of breath/ difficulty breathing, fever/chills, cough, muscle /body ache, headache, loss of taste or smell.    Plan   1. Education and Instructions given to patient regarding upcoming surgery  2. NPO as per ASU  3. Take the following medications with sips of water on the day of procedure: Losartan  4. Drink a quart of extra  fluids the day before your surgery.  5. Medical optimization for surgery with Dr. Henry  6. CBC, BMP, Urinalysis, Type and Screen done in PST and sent to lab  7. EKG done in PST  10. Do not take NSAIDS, Aspirin, Herbal supplements, Multivitamins, Vitamin E or Fish oil 7 days prior to surgery

## 2024-04-30 NOTE — H&P PST ADULT - HISTORY OF PRESENT ILLNESS
59 years old female polyps and fibroids. She had gyn examination and ultrasound done.  Planned dilation and  curettage, hysteroscopy and polypectomy.

## 2024-04-30 NOTE — H&P PST ADULT - NSICDXPASTMEDICALHX_GEN_ALL_CORE_FT
PAST MEDICAL HISTORY:  CAD (coronary artery disease)     Cardiomegaly     Constipation     DDD (degenerative disc disease), cervical     GERD (gastroesophageal reflux disease)     History of simple renal cyst     HLD (hyperlipidemia)     HTN (hypertension)     Internal hemorrhoids     Mild mitral regurgitation     Mild tricuspid regurgitation     NAFL (nonalcoholic fatty liver)     Uterine leiomyoma

## 2024-05-01 DIAGNOSIS — Z01.818 ENCOUNTER FOR OTHER PREPROCEDURAL EXAMINATION: ICD-10-CM

## 2024-05-07 ENCOUNTER — APPOINTMENT (OUTPATIENT)
Dept: CARDIOLOGY | Facility: CLINIC | Age: 59
End: 2024-05-07
Payer: COMMERCIAL

## 2024-05-07 VITALS
DIASTOLIC BLOOD PRESSURE: 70 MMHG | WEIGHT: 169 LBS | OXYGEN SATURATION: 99 % | HEIGHT: 59 IN | BODY MASS INDEX: 34.07 KG/M2 | HEART RATE: 76 BPM | SYSTOLIC BLOOD PRESSURE: 132 MMHG

## 2024-05-07 DIAGNOSIS — I10 ESSENTIAL (PRIMARY) HYPERTENSION: ICD-10-CM

## 2024-05-07 DIAGNOSIS — E78.5 HYPERLIPIDEMIA, UNSPECIFIED: ICD-10-CM

## 2024-05-07 DIAGNOSIS — R07.89 OTHER CHEST PAIN: ICD-10-CM

## 2024-05-07 PROCEDURE — 93000 ELECTROCARDIOGRAM COMPLETE: CPT

## 2024-05-07 PROCEDURE — G2211 COMPLEX E/M VISIT ADD ON: CPT | Mod: NC,1L

## 2024-05-07 PROCEDURE — 99214 OFFICE O/P EST MOD 30 MIN: CPT | Mod: 25

## 2024-05-07 RX ORDER — ATORVASTATIN CALCIUM 40 MG/1
40 TABLET, FILM COATED ORAL
Qty: 90 | Refills: 3 | Status: ACTIVE | COMMUNITY

## 2024-05-07 RX ORDER — LOSARTAN POTASSIUM 50 MG/1
50 TABLET, FILM COATED ORAL TWICE DAILY
Qty: 180 | Refills: 3 | Status: ACTIVE | COMMUNITY
Start: 2023-11-21

## 2024-05-08 ENCOUNTER — APPOINTMENT (OUTPATIENT)
Dept: OBGYN | Facility: CLINIC | Age: 59
End: 2024-05-08
Payer: COMMERCIAL

## 2024-05-08 ENCOUNTER — ASOB RESULT (OUTPATIENT)
Age: 59
End: 2024-05-08

## 2024-05-08 ENCOUNTER — APPOINTMENT (OUTPATIENT)
Dept: ANTEPARTUM | Facility: CLINIC | Age: 59
End: 2024-05-08
Payer: COMMERCIAL

## 2024-05-08 VITALS
SYSTOLIC BLOOD PRESSURE: 156 MMHG | TEMPERATURE: 97.8 F | DIASTOLIC BLOOD PRESSURE: 93 MMHG | WEIGHT: 169 LBS | HEART RATE: 71 BPM | HEIGHT: 59 IN | BODY MASS INDEX: 34.07 KG/M2

## 2024-05-08 PROBLEM — K21.9 GASTRO-ESOPHAGEAL REFLUX DISEASE WITHOUT ESOPHAGITIS: Chronic | Status: ACTIVE | Noted: 2024-04-30

## 2024-05-08 PROBLEM — K64.8 OTHER HEMORRHOIDS: Chronic | Status: ACTIVE | Noted: 2024-04-30

## 2024-05-08 PROBLEM — I07.1 RHEUMATIC TRICUSPID INSUFFICIENCY: Chronic | Status: ACTIVE | Noted: 2024-04-30

## 2024-05-08 PROBLEM — M50.30 OTHER CERVICAL DISC DEGENERATION, UNSPECIFIED CERVICAL REGION: Chronic | Status: ACTIVE | Noted: 2024-04-30

## 2024-05-08 PROBLEM — Z87.448 PERSONAL HISTORY OF OTHER DISEASES OF URINARY SYSTEM: Chronic | Status: ACTIVE | Noted: 2024-04-30

## 2024-05-08 PROBLEM — I25.10 ATHEROSCLEROTIC HEART DISEASE OF NATIVE CORONARY ARTERY WITHOUT ANGINA PECTORIS: Chronic | Status: ACTIVE | Noted: 2024-04-30

## 2024-05-08 PROBLEM — K59.00 CONSTIPATION, UNSPECIFIED: Chronic | Status: ACTIVE | Noted: 2024-04-30

## 2024-05-08 PROBLEM — D25.9 LEIOMYOMA OF UTERUS, UNSPECIFIED: Chronic | Status: ACTIVE | Noted: 2024-04-30

## 2024-05-08 PROBLEM — I34.0 NONRHEUMATIC MITRAL (VALVE) INSUFFICIENCY: Chronic | Status: ACTIVE | Noted: 2024-04-30

## 2024-05-08 PROBLEM — K76.0 FATTY (CHANGE OF) LIVER, NOT ELSEWHERE CLASSIFIED: Chronic | Status: ACTIVE | Noted: 2024-04-30

## 2024-05-08 PROBLEM — I51.7 CARDIOMEGALY: Chronic | Status: ACTIVE | Noted: 2024-04-30

## 2024-05-08 LAB — HEMOGLOBIN: 12.1

## 2024-05-08 PROCEDURE — 76856 US EXAM PELVIC COMPLETE: CPT | Mod: 59

## 2024-05-08 PROCEDURE — ZZZZZ: CPT

## 2024-05-08 PROCEDURE — 76830 TRANSVAGINAL US NON-OB: CPT

## 2024-05-08 RX ORDER — FENTANYL CITRATE 50 UG/ML
25 INJECTION INTRAVENOUS
Refills: 0 | Status: DISCONTINUED | OUTPATIENT
Start: 2024-05-09 | End: 2024-05-09

## 2024-05-08 RX ORDER — SODIUM CHLORIDE 9 MG/ML
1000 INJECTION, SOLUTION INTRAVENOUS
Refills: 0 | Status: DISCONTINUED | OUTPATIENT
Start: 2024-05-09 | End: 2024-05-09

## 2024-05-08 RX ORDER — FENTANYL CITRATE 50 UG/ML
50 INJECTION INTRAVENOUS
Refills: 0 | Status: DISCONTINUED | OUTPATIENT
Start: 2024-05-09 | End: 2024-05-09

## 2024-05-09 ENCOUNTER — APPOINTMENT (OUTPATIENT)
Dept: OBGYN | Facility: HOSPITAL | Age: 59
End: 2024-05-09

## 2024-05-09 ENCOUNTER — OUTPATIENT (OUTPATIENT)
Dept: INPATIENT UNIT | Facility: HOSPITAL | Age: 59
LOS: 1 days | Discharge: HOME | End: 2024-05-09
Payer: COMMERCIAL

## 2024-05-09 ENCOUNTER — TRANSCRIPTION ENCOUNTER (OUTPATIENT)
Age: 59
End: 2024-05-09

## 2024-05-09 ENCOUNTER — RESULT REVIEW (OUTPATIENT)
Age: 59
End: 2024-05-09

## 2024-05-09 VITALS
DIASTOLIC BLOOD PRESSURE: 88 MMHG | TEMPERATURE: 98 F | WEIGHT: 166.89 LBS | RESPIRATION RATE: 16 BRPM | HEIGHT: 61 IN | HEART RATE: 81 BPM | OXYGEN SATURATION: 99 % | SYSTOLIC BLOOD PRESSURE: 132 MMHG

## 2024-05-09 VITALS
OXYGEN SATURATION: 97 % | DIASTOLIC BLOOD PRESSURE: 76 MMHG | SYSTOLIC BLOOD PRESSURE: 128 MMHG | RESPIRATION RATE: 15 BRPM | TEMPERATURE: 98 F | HEART RATE: 70 BPM

## 2024-05-09 DIAGNOSIS — N93.9 ABNORMAL UTERINE AND VAGINAL BLEEDING, UNSPECIFIED: ICD-10-CM

## 2024-05-09 DIAGNOSIS — K21.9 GASTRO-ESOPHAGEAL REFLUX DISEASE WITHOUT ESOPHAGITIS: ICD-10-CM

## 2024-05-09 DIAGNOSIS — E78.5 HYPERLIPIDEMIA, UNSPECIFIED: ICD-10-CM

## 2024-05-09 DIAGNOSIS — N84.0 POLYP OF CORPUS UTERI: ICD-10-CM

## 2024-05-09 DIAGNOSIS — D25.9 LEIOMYOMA OF UTERUS, UNSPECIFIED: ICD-10-CM

## 2024-05-09 DIAGNOSIS — I07.1 RHEUMATIC TRICUSPID INSUFFICIENCY: ICD-10-CM

## 2024-05-09 DIAGNOSIS — I34.0 NONRHEUMATIC MITRAL (VALVE) INSUFFICIENCY: ICD-10-CM

## 2024-05-09 DIAGNOSIS — I10 ESSENTIAL (PRIMARY) HYPERTENSION: ICD-10-CM

## 2024-05-09 DIAGNOSIS — I25.10 ATHEROSCLEROTIC HEART DISEASE OF NATIVE CORONARY ARTERY WITHOUT ANGINA PECTORIS: ICD-10-CM

## 2024-05-09 DIAGNOSIS — N28.1 CYST OF KIDNEY, ACQUIRED: Chronic | ICD-10-CM

## 2024-05-09 DIAGNOSIS — Z78.0 ASYMPTOMATIC MENOPAUSAL STATE: ICD-10-CM

## 2024-05-09 PROBLEM — R07.89 ATYPICAL CHEST PAIN: Status: ACTIVE | Noted: 2023-11-21

## 2024-05-09 PROCEDURE — 58558 HYSTEROSCOPY BIOPSY: CPT

## 2024-05-09 PROCEDURE — 88305 TISSUE EXAM BY PATHOLOGIST: CPT | Mod: 26

## 2024-05-09 PROCEDURE — 88305 TISSUE EXAM BY PATHOLOGIST: CPT

## 2024-05-09 RX ORDER — VITAMIN E 100 UNIT
0 CAPSULE ORAL
Refills: 0 | DISCHARGE

## 2024-05-09 RX ORDER — CHOLECALCIFEROL (VITAMIN D3) 125 MCG
1 CAPSULE ORAL
Refills: 0 | DISCHARGE

## 2024-05-09 RX ORDER — ATORVASTATIN CALCIUM 80 MG/1
1 TABLET, FILM COATED ORAL
Refills: 0 | DISCHARGE

## 2024-05-09 RX ORDER — ONDANSETRON 8 MG/1
4 TABLET, FILM COATED ORAL ONCE
Refills: 0 | Status: COMPLETED | OUTPATIENT
Start: 2024-05-09 | End: 2024-05-09

## 2024-05-09 RX ORDER — OMEPRAZOLE 10 MG/1
0 CAPSULE, DELAYED RELEASE ORAL
Refills: 0 | DISCHARGE

## 2024-05-09 RX ADMIN — ONDANSETRON 4 MILLIGRAM(S): 8 TABLET, FILM COATED ORAL at 14:25

## 2024-05-09 NOTE — ASU PATIENT PROFILE, ADULT - HEALTHCARE INFORMATION NEEDED, PROFILE
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

## 2024-05-09 NOTE — ASU PATIENT PROFILE, ADULT - FALL HARM RISK - HARM RISK INTERVENTIONS

## 2024-05-09 NOTE — BRIEF OPERATIVE NOTE - NSICDXBRIEFPROCEDURE_GEN_ALL_CORE_FT
PROCEDURES:  Exam under anesthesia, gynecologic 09-May-2024 12:25:03  Philippe Young  Hysteroscopy, with dilation and curettage of uterus and polypectomy or uterine myomectomy using MyoSure tissue removal system 09-May-2024 12:25:21  Philippe Young

## 2024-05-09 NOTE — ASU DISCHARGE PLAN (ADULT/PEDIATRIC) - B. DRINK ALCOHOL, BEER, OR WINE
[de-identified] : Ear pain & hearing loss for about 8 months. The pain begins with a feeling like the ear needs to pop and then progresses over an hour when she notices a unilateral (though alternating) loud nonpulsatile vibrating sound for <30 sec which then resolves; subsequent hearing loss lasts 5-10 min and then returns to normal. No dizziness. Hx ear infections as a child but no surgery. No drainage. Saw a neurologist who ordered an MRI that showed some kind of cyst. \par Hx severe complicated migraines that used to come & go but these have improved in the last 3 yrs. No meds for this now. \par Frequent acid reflux, clearing, phlegm, and cough. This is untreated. 
Statement Selected

## 2024-05-09 NOTE — BRIEF OPERATIVE NOTE - OPERATION/FINDINGS
endometrial polyp; symmetric cavity; normal ostia 1 cm endometrial polyp; symmetric cavity; normal ostia; atrophic endometrium

## 2024-05-09 NOTE — BRIEF OPERATIVE NOTE - NSICDXBRIEFPOSTOP_GEN_ALL_CORE_FT
POST-OP DIAGNOSIS:  Endometrial polyp 09-May-2024 12:26:15  Philippe Young  Increased endometrial stripe 09-May-2024 12:26:06  Philippe Young

## 2024-05-09 NOTE — ASSESSMENT
[FreeTextEntry1] : Hypertension   BP mildly elevated today  cont losartan 50mg BID  encouraged her to monitor BP at home   Noncardiac chest pain  echo showed normal LV function., mild MR, mild TR.  A coronary CT showed no evidence of obstructive disease but with mild LAD, LM and circumflex stenoses, calcium score 134.  Cont ASA 81mg daily. statin   HLD   cont atorvastatin 40mg   Will f/u with patient in 6 months.

## 2024-05-09 NOTE — ASU DISCHARGE PLAN (ADULT/PEDIATRIC) - CARE PROVIDER_API CALL
Philippe Young  Obstetrics and Gynecology  [address] [place] [id number]  Phone: [phone]  Fax: [phone]  Follow Up Time: 2 weeks

## 2024-05-09 NOTE — CARDIOLOGY SUMMARY
[de-identified] : CONCLUSIONS: 1. Left ventricular wall thickness is normal. Left ventricular systolic function is normal with an ejection fraction visually estimated at 60 to 65 %. 2. Normal right ventricular cavity size and systolic function. 3. Mild mitral regurgitation. 4. Mild tricuspid regurgitation.

## 2024-05-09 NOTE — HISTORY OF PRESENT ILLNESS
[FreeTextEntry1] : Ms. Antony is a 57 yo Omani speaking female with a hx hypertension and dyslipidemia presenting for a routine follow up.   At our last visit, she c/o intermittent chest and back pain. No personal hx CAD.  She was told she had a "big heart in the past" and has been on aspirin for 2 years for "numbness in hand".   She had an echo that showed normal LV function., mild MR, mild TR.  A coronary CT showed no evidence of obstructive disease but with mild LAD, LM and circumflex stenoses, calcium score 134.   Lifetime non-smoker, no alcohol or drug use.  She exercises- walking daily.  Family hx includes father and brother with cardiac arrest; details unknown.  Recently moved from [facility]

## 2024-05-09 NOTE — ASU DISCHARGE PLAN (ADULT/PEDIATRIC) - NS MD DC FALL RISK RISK
For information on Fall & Injury Prevention, visit: [email or web address] OR  [email or web address] OR  https://www.cdc.gov/steadi/patient.html

## 2024-05-10 NOTE — HISTORY OF PRESENT ILLNESS
[FreeTextEntry1] : Patient here for final decision for surgery. plan: D&C hysteroscopy polypectomy.  referred by Dr. Cadena.

## 2024-05-10 NOTE — PLAN
[FreeTextEntry1] : FLORA Grewal used throughout todays visit for Sudanese translation. Discussed pre op and post op instructions in detail. Vaginal restrictions post op only all of patients questions regarding procedure were answered. consent signed by MD, patient and witness. Discussed risks including bleeding, infection and anesthesia complications she is to f/u with me 2 weeks post operatively. she is agreeable with plan.  I Jeniffer LARIOS am scribing for the presence of Dr. Young the following sections HISTORY OF PRESENT ILLNESS, PAST MEDICAL/FAMILY/SOCIAL HISTORY; REVIEW OF SYSTEMS; VITAL SIGNS; PHYSICAL EXAM; DISPOSITION.    I personally performed the services described in the documentation, reviewed the documentation recorded by the scribe in my presence and it accurately and completely records my words and actions.

## 2024-05-10 NOTE — PHYSICAL EXAM
[Chaperone Present] : A chaperone was present in the examining room during all aspects of the physical examination [Appropriately responsive] : appropriately responsive [Alert] : alert [No Acute Distress] : no acute distress [No Lymphadenopathy] : no lymphadenopathy [Regular Rate Rhythm] : regular rate rhythm [No Murmurs] : no murmurs [Clear to Auscultation B/L] : clear to auscultation bilaterally [Soft] : soft [Non-tender] : non-tender [Non-distended] : non-distended [No HSM] : No HSM [No Lesions] : no lesions [No Mass] : no mass [Oriented x3] : oriented x3 [Labia Majora] : normal [Labia Minora] : normal [Normal] : normal [Uterine Adnexae] : normal [FreeTextEntry2] : CHARLIE HollinsC

## 2024-05-15 LAB — SURGICAL PATHOLOGY STUDY: SIGNIFICANT CHANGE UP

## 2024-05-24 ENCOUNTER — APPOINTMENT (OUTPATIENT)
Dept: OBGYN | Facility: CLINIC | Age: 59
End: 2024-05-24
Payer: COMMERCIAL

## 2024-05-24 VITALS — SYSTOLIC BLOOD PRESSURE: 124 MMHG | DIASTOLIC BLOOD PRESSURE: 77 MMHG | HEART RATE: 75 BPM

## 2024-05-24 LAB — HEMOGLOBIN: 12.5

## 2024-05-24 PROCEDURE — ZZZZZ: CPT

## 2024-05-24 NOTE — PLAN
[None] : None [FreeTextEntry1] : patient to follow up  for her annual visit- note dictated  she is to call me if she has any questions or concerns regarding her procedure. all of her questions were answered she is agreeable with plan   I Linda Chaves [facility] am scribing for the presence of Dr. Young the following sections HISTORY OF PRESENT ILLNESS, PAST MEDICAL/FAMILY/SOCIAL HISTORY; REVIEW OF SYSTEMS; VITAL SIGNS; PHYSICAL EXAM; DISPOSITION. I personally performed the services described in the documentation, reviewed the documentation recorded by the scribe in my presence and it accurately and completely records my words and actions.

## 2024-05-24 NOTE — HISTORY OF PRESENT ILLNESS
[Pain is well-controlled] : pain is well-controlled [None] : no vaginal bleeding [Normal] : normal [Pathology reviewed] : pathology reviewed [Fever] : no fever [Chills] : no chills [Nausea] : no nausea [Vomiting] : no vomiting [de-identified] : Patient is a 60yo female here today post op s/p D&C hysteroscopy polypectomy 5/9/24

## 2024-05-30 ENCOUNTER — NON-APPOINTMENT (OUTPATIENT)
Age: 59
End: 2024-05-30

## 2024-05-31 ENCOUNTER — APPOINTMENT (OUTPATIENT)
Dept: OTOLARYNGOLOGY | Facility: CLINIC | Age: 59
End: 2024-05-31
Payer: COMMERCIAL

## 2024-05-31 VITALS
BODY MASS INDEX: 33.87 KG/M2 | SYSTOLIC BLOOD PRESSURE: 121 MMHG | HEART RATE: 72 BPM | WEIGHT: 168 LBS | HEIGHT: 59 IN | DIASTOLIC BLOOD PRESSURE: 82 MMHG

## 2024-05-31 DIAGNOSIS — Z80.49 FAMILY HISTORY OF MALIGNANT NEOPLASM OF OTHER GENITAL ORGANS: ICD-10-CM

## 2024-05-31 DIAGNOSIS — L29.9 PRURITUS, UNSPECIFIED: ICD-10-CM

## 2024-05-31 DIAGNOSIS — M26.609 UNSPECIFIED TEMPOROMANDIBULAR JOINT DISORDER: ICD-10-CM

## 2024-05-31 DIAGNOSIS — Z78.9 OTHER SPECIFIED HEALTH STATUS: ICD-10-CM

## 2024-05-31 DIAGNOSIS — H61.20 IMPACTED CERUMEN, UNSPECIFIED EAR: ICD-10-CM

## 2024-05-31 PROCEDURE — 92557 COMPREHENSIVE HEARING TEST: CPT

## 2024-05-31 PROCEDURE — 99203 OFFICE O/P NEW LOW 30 MIN: CPT | Mod: 25

## 2024-05-31 PROCEDURE — 92567 TYMPANOMETRY: CPT

## 2024-05-31 RX ORDER — FLUOCINOLONE ACETONIDE 0.11 MG/ML
0.01 OIL AURICULAR (OTIC)
Qty: 1 | Refills: 2 | Status: ACTIVE | COMMUNITY
Start: 2024-05-31 | End: 1900-01-01

## 2024-05-31 NOTE — REASON FOR VISIT
[Initial Consultation] : an initial consultation for [FreeTextEntry2] : Left ear pain and itchiness

## 2024-05-31 NOTE — PHYSICAL EXAM
[Normal] : mucosa is normal [Midline] : trachea located in midline position [de-identified] : TMJ TENDERNESS [de-identified] : RIGHT CERUMEN REMOVED

## 2024-05-31 NOTE — REVIEW OF SYSTEMS
[Seasonal Allergies] : seasonal allergies [Ear Pain] : ear pain [Ear Itch] : ear itch [Hearing Loss] : hearing loss [Dizziness] : dizziness [Vertigo] : vertigo [Ear Drainage] : ear drainage [Ear Noises] : ear noises [Nasal Congestion] : nasal congestion [Hoarseness] : hoarseness [Throat Clearing] : throat clearing [Throat Dryness] : throat dryness [Throat Itching] : throat itching [Swelling Neck] : swelling neck [Swelling Face] : face swelling [Negative] : Heme/Lymph [Patient Intake Form Reviewed] : Patient intake form was reviewed

## 2024-11-11 ENCOUNTER — APPOINTMENT (OUTPATIENT)
Dept: CARDIOLOGY | Facility: CLINIC | Age: 59
End: 2024-11-11
Payer: COMMERCIAL

## 2024-11-11 VITALS
DIASTOLIC BLOOD PRESSURE: 84 MMHG | WEIGHT: 154 LBS | OXYGEN SATURATION: 99 % | HEART RATE: 85 BPM | HEIGHT: 59 IN | SYSTOLIC BLOOD PRESSURE: 126 MMHG | BODY MASS INDEX: 31.04 KG/M2

## 2024-11-11 PROCEDURE — 93000 ELECTROCARDIOGRAM COMPLETE: CPT

## 2024-11-11 PROCEDURE — 99214 OFFICE O/P EST MOD 30 MIN: CPT | Mod: 25

## 2024-11-21 ENCOUNTER — NON-APPOINTMENT (OUTPATIENT)
Age: 59
End: 2024-11-21

## 2024-11-26 ENCOUNTER — APPOINTMENT (OUTPATIENT)
Dept: ALLERGY | Facility: CLINIC | Age: 59
End: 2024-11-26
Payer: COMMERCIAL

## 2024-11-26 VITALS
SYSTOLIC BLOOD PRESSURE: 115 MMHG | WEIGHT: 152 LBS | HEART RATE: 80 BPM | OXYGEN SATURATION: 100 % | HEIGHT: 59 IN | BODY MASS INDEX: 30.64 KG/M2 | DIASTOLIC BLOOD PRESSURE: 76 MMHG

## 2024-11-26 DIAGNOSIS — L50.1 IDIOPATHIC URTICARIA: ICD-10-CM

## 2024-11-26 PROCEDURE — 99204 OFFICE O/P NEW MOD 45 MIN: CPT

## 2024-11-26 RX ORDER — HYDROXYZINE HYDROCHLORIDE 25 MG/1
25 TABLET ORAL
Qty: 60 | Refills: 1 | Status: ACTIVE | COMMUNITY
Start: 2024-11-26 | End: 1900-01-01

## 2024-11-26 RX ORDER — LORATADINE 10 MG/1
10 TABLET ORAL DAILY
Qty: 90 | Refills: 2 | Status: ACTIVE | COMMUNITY
Start: 2024-11-26 | End: 1900-01-01

## 2024-11-26 RX ORDER — EPINEPHRINE 0.3 MG/.3ML
0.3 INJECTION INTRAMUSCULAR
Qty: 1 | Refills: 2 | Status: ACTIVE | COMMUNITY
Start: 2024-11-26 | End: 1900-01-01

## 2025-01-21 ENCOUNTER — APPOINTMENT (OUTPATIENT)
Dept: ALLERGY | Facility: CLINIC | Age: 60
End: 2025-01-21
Payer: COMMERCIAL

## 2025-01-21 VITALS
HEART RATE: 69 BPM | DIASTOLIC BLOOD PRESSURE: 77 MMHG | WEIGHT: 150 LBS | BODY MASS INDEX: 30.24 KG/M2 | HEIGHT: 59 IN | OXYGEN SATURATION: 98 % | SYSTOLIC BLOOD PRESSURE: 125 MMHG

## 2025-01-21 PROCEDURE — 99213 OFFICE O/P EST LOW 20 MIN: CPT

## 2025-02-08 ENCOUNTER — EMERGENCY (EMERGENCY)
Facility: HOSPITAL | Age: 60
LOS: 0 days | Discharge: HOME | End: 2025-02-08
Attending: EMERGENCY MEDICINE
Payer: COMMERCIAL

## 2025-02-08 VITALS
HEART RATE: 76 BPM | RESPIRATION RATE: 18 BRPM | SYSTOLIC BLOOD PRESSURE: 143 MMHG | DIASTOLIC BLOOD PRESSURE: 76 MMHG | TEMPERATURE: 99 F | WEIGHT: 153 LBS | OXYGEN SATURATION: 100 %

## 2025-02-08 VITALS
OXYGEN SATURATION: 97 % | TEMPERATURE: 99 F | SYSTOLIC BLOOD PRESSURE: 135 MMHG | HEART RATE: 75 BPM | DIASTOLIC BLOOD PRESSURE: 74 MMHG | RESPIRATION RATE: 17 BRPM

## 2025-02-08 DIAGNOSIS — N28.1 CYST OF KIDNEY, ACQUIRED: Chronic | ICD-10-CM

## 2025-02-08 DIAGNOSIS — M54.50 LOW BACK PAIN, UNSPECIFIED: ICD-10-CM

## 2025-02-08 DIAGNOSIS — I25.10 ATHEROSCLEROTIC HEART DISEASE OF NATIVE CORONARY ARTERY WITHOUT ANGINA PECTORIS: ICD-10-CM

## 2025-02-08 DIAGNOSIS — I10 ESSENTIAL (PRIMARY) HYPERTENSION: ICD-10-CM

## 2025-02-08 DIAGNOSIS — E78.5 HYPERLIPIDEMIA, UNSPECIFIED: ICD-10-CM
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
## 2025-02-08 DIAGNOSIS — M51.26 OTHER INTERVERTEBRAL DISC DISPLACEMENT, LUMBAR REGION: ICD-10-CM

## 2025-02-08 DIAGNOSIS — M19.90 UNSPECIFIED OSTEOARTHRITIS, UNSPECIFIED SITE: ICD-10-CM

## 2025-02-08 DIAGNOSIS — K21.9 GASTRO-ESOPHAGEAL REFLUX DISEASE WITHOUT ESOPHAGITIS: ICD-10-CM
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
## 2025-02-08 PROCEDURE — 96375 TX/PRO/DX INJ NEW DRUG ADDON: CPT

## 2025-02-08 PROCEDURE — 99285 EMERGENCY DEPT VISIT HI MDM: CPT

## 2025-02-08 PROCEDURE — 96374 THER/PROPH/DIAG INJ IV PUSH: CPT

## 2025-02-08 PROCEDURE — 72131 CT LUMBAR SPINE W/O DYE: CPT | Mod: MC

## 2025-02-08 PROCEDURE — 72131 CT LUMBAR SPINE W/O DYE: CPT | Mod: 26

## 2025-02-08 PROCEDURE — 99284 EMERGENCY DEPT VISIT MOD MDM: CPT | Mod: 25

## 2025-02-08 RX ORDER — LIDOCAINE HYDROCHLORIDE 20 MG/ML
1 JELLY TOPICAL ONCE
Refills: 0 | Status: COMPLETED | OUTPATIENT
Start: 2025-02-08 | End: 2025-02-08

## 2025-02-08 RX ORDER — IBUPROFEN 200 MG
1 TABLET ORAL
Qty: 15 | Refills: 0
Start: 2025-02-08 | End: 2025-02-12

## 2025-02-08 RX ORDER — CYCLOBENZAPRINE HYDROCHLORIDE 15 MG/1
1 CAPSULE, EXTENDED RELEASE ORAL
Qty: 15 | Refills: 0
Start: 2025-02-08 | End: 2025-02-12

## 2025-02-08 RX ORDER — ACETAMINOPHEN 500 MG/5ML
1000 LIQUID (ML) ORAL ONCE
Refills: 0 | Status: COMPLETED | OUTPATIENT
Start: 2025-02-08 | End: 2025-02-08

## 2025-02-08 RX ORDER — DIAZEPAM 5 MG/1
5 TABLET ORAL ONCE
Refills: 0 | Status: DISCONTINUED | OUTPATIENT
Start: 2025-02-08 | End: 2025-02-08

## 2025-02-08 RX ADMIN — LIDOCAINE HYDROCHLORIDE 1 PATCH: 20 JELLY TOPICAL at 18:02

## 2025-02-08 RX ADMIN — DIAZEPAM 5 MILLIGRAM(S): 5 TABLET ORAL at 18:01

## 2025-02-08 RX ADMIN — Medication 400 MILLIGRAM(S): at 18:02

## 2025-02-25 ENCOUNTER — APPOINTMENT (OUTPATIENT)
Dept: NEUROSURGERY | Facility: CLINIC | Age: 60
End: 2025-02-25
Payer: COMMERCIAL

## 2025-02-25 DIAGNOSIS — M54.2 CERVICALGIA: ICD-10-CM

## 2025-02-25 DIAGNOSIS — G89.29 CERVICALGIA: ICD-10-CM

## 2025-02-25 DIAGNOSIS — G89.29 LUMBAGO WITH SCIATICA, LEFT SIDE: ICD-10-CM

## 2025-02-25 DIAGNOSIS — M54.41 LUMBAGO WITH SCIATICA, LEFT SIDE: ICD-10-CM

## 2025-02-25 DIAGNOSIS — M54.42 LUMBAGO WITH SCIATICA, LEFT SIDE: ICD-10-CM

## 2025-02-25 PROCEDURE — 99204 OFFICE O/P NEW MOD 45 MIN: CPT

## 2025-03-11 ENCOUNTER — APPOINTMENT (OUTPATIENT)
Dept: MRI IMAGING | Facility: CLINIC | Age: 60
End: 2025-03-11
Payer: COMMERCIAL

## 2025-03-11 ENCOUNTER — OUTPATIENT (OUTPATIENT)
Dept: OUTPATIENT SERVICES | Facility: HOSPITAL | Age: 60
LOS: 1 days | End: 2025-03-11
Payer: COMMERCIAL

## 2025-03-11 DIAGNOSIS — N28.1 CYST OF KIDNEY, ACQUIRED: Chronic | ICD-10-CM

## 2025-03-11 DIAGNOSIS — M54.2 CERVICALGIA: ICD-10-CM

## 2025-03-11 PROCEDURE — 72148 MRI LUMBAR SPINE W/O DYE: CPT | Mod: 26

## 2025-03-11 PROCEDURE — 72141 MRI NECK SPINE W/O DYE: CPT

## 2025-03-11 PROCEDURE — 72141 MRI NECK SPINE W/O DYE: CPT | Mod: 26

## 2025-03-11 PROCEDURE — 72148 MRI LUMBAR SPINE W/O DYE: CPT

## 2025-03-24 ENCOUNTER — APPOINTMENT (OUTPATIENT)
Dept: CARDIOLOGY | Facility: CLINIC | Age: 60
End: 2025-03-24

## 2025-03-24 VITALS
DIASTOLIC BLOOD PRESSURE: 70 MMHG | HEIGHT: 59 IN | WEIGHT: 153 LBS | SYSTOLIC BLOOD PRESSURE: 120 MMHG | BODY MASS INDEX: 30.84 KG/M2 | OXYGEN SATURATION: 100 % | HEART RATE: 74 BPM

## 2025-03-24 PROCEDURE — 99214 OFFICE O/P EST MOD 30 MIN: CPT

## 2025-03-24 PROCEDURE — G2211 COMPLEX E/M VISIT ADD ON: CPT | Mod: NC
